# Patient Record
Sex: MALE | Race: ASIAN | NOT HISPANIC OR LATINO | Employment: UNEMPLOYED
[De-identification: names, ages, dates, MRNs, and addresses within clinical notes are randomized per-mention and may not be internally consistent; named-entity substitution may affect disease eponyms.]

---

## 2017-04-06 ENCOUNTER — RECORDS - HEALTHEAST (OUTPATIENT)
Dept: ADMINISTRATIVE | Facility: OTHER | Age: 72
End: 2017-04-06

## 2021-05-29 ENCOUNTER — RECORDS - HEALTHEAST (OUTPATIENT)
Dept: ADMINISTRATIVE | Facility: CLINIC | Age: 76
End: 2021-05-29

## 2023-10-06 ENCOUNTER — APPOINTMENT (OUTPATIENT)
Dept: CT IMAGING | Facility: HOSPITAL | Age: 78
DRG: 378 | End: 2023-10-06
Attending: EMERGENCY MEDICINE
Payer: COMMERCIAL

## 2023-10-06 ENCOUNTER — APPOINTMENT (OUTPATIENT)
Dept: INTERPRETER SERVICES | Facility: CLINIC | Age: 78
End: 2023-10-06
Payer: COMMERCIAL

## 2023-10-06 ENCOUNTER — ANESTHESIA EVENT (OUTPATIENT)
Dept: SURGERY | Facility: HOSPITAL | Age: 78
DRG: 378 | End: 2023-10-06
Payer: COMMERCIAL

## 2023-10-06 ENCOUNTER — HOSPITAL ENCOUNTER (INPATIENT)
Facility: HOSPITAL | Age: 78
LOS: 2 days | Discharge: HOME | DRG: 378 | End: 2023-10-08
Attending: EMERGENCY MEDICINE | Admitting: HOSPITALIST
Payer: COMMERCIAL

## 2023-10-06 ENCOUNTER — ANESTHESIA (OUTPATIENT)
Dept: SURGERY | Facility: HOSPITAL | Age: 78
DRG: 378 | End: 2023-10-06
Payer: COMMERCIAL

## 2023-10-06 DIAGNOSIS — K92.2 GASTROINTESTINAL HEMORRHAGE, UNSPECIFIED GASTROINTESTINAL HEMORRHAGE TYPE: ICD-10-CM

## 2023-10-06 DIAGNOSIS — K92.1 MELENA: Primary | ICD-10-CM

## 2023-10-06 DIAGNOSIS — E03.9 HYPOTHYROIDISM, UNSPECIFIED TYPE: ICD-10-CM

## 2023-10-06 DIAGNOSIS — I10 ESSENTIAL HYPERTENSION, BENIGN: ICD-10-CM

## 2023-10-06 PROBLEM — D62 ANEMIA DUE TO BLOOD LOSS, ACUTE: Status: ACTIVE | Noted: 2023-10-06

## 2023-10-06 LAB
ABO/RH(D): NORMAL
ALBUMIN UR-MCNC: NEGATIVE MG/DL
ANION GAP SERPL CALCULATED.3IONS-SCNC: 8 MMOL/L (ref 7–15)
ANION GAP SERPL CALCULATED.3IONS-SCNC: 8 MMOL/L (ref 7–15)
ANTIBODY SCREEN: NEGATIVE
APPEARANCE UR: CLEAR
BASO+EOS+MONOS # BLD AUTO: ABNORMAL 10*3/UL
BASO+EOS+MONOS NFR BLD AUTO: ABNORMAL %
BASOPHILS # BLD AUTO: 0 10E3/UL (ref 0–0.2)
BASOPHILS NFR BLD AUTO: 0 %
BILIRUB UR QL STRIP: NEGATIVE
BLD PROD TYP BPU: NORMAL
BLOOD COMPONENT TYPE: NORMAL
BUN SERPL-MCNC: 38.3 MG/DL (ref 8–23)
BUN SERPL-MCNC: 48.2 MG/DL (ref 8–23)
CALCIUM SERPL-MCNC: 8 MG/DL (ref 8.8–10.2)
CALCIUM SERPL-MCNC: 8.4 MG/DL (ref 8.8–10.2)
CHLORIDE SERPL-SCNC: 108 MMOL/L (ref 98–107)
CHLORIDE SERPL-SCNC: 112 MMOL/L (ref 98–107)
CODING SYSTEM: NORMAL
COLOR UR AUTO: COLORLESS
CREAT SERPL-MCNC: 1.49 MG/DL (ref 0.67–1.17)
CREAT SERPL-MCNC: 1.55 MG/DL (ref 0.67–1.17)
CROSSMATCH: NORMAL
DEPRECATED HCO3 PLAS-SCNC: 20 MMOL/L (ref 22–29)
DEPRECATED HCO3 PLAS-SCNC: 21 MMOL/L (ref 22–29)
EGFRCR SERPLBLD CKD-EPI 2021: 46 ML/MIN/1.73M2
EGFRCR SERPLBLD CKD-EPI 2021: 48 ML/MIN/1.73M2
EOSINOPHIL # BLD AUTO: 0.9 10E3/UL (ref 0–0.7)
EOSINOPHIL NFR BLD AUTO: 8 %
ERYTHROCYTE [DISTWIDTH] IN BLOOD BY AUTOMATED COUNT: 14.6 % (ref 10–15)
FERRITIN SERPL-MCNC: 158 NG/ML (ref 31–409)
FOLATE SERPL-MCNC: 12.6 NG/ML (ref 4.6–34.8)
GLUCOSE BLDC GLUCOMTR-MCNC: 87 MG/DL (ref 70–99)
GLUCOSE SERPL-MCNC: 101 MG/DL (ref 70–99)
GLUCOSE SERPL-MCNC: 85 MG/DL (ref 70–99)
GLUCOSE UR STRIP-MCNC: NEGATIVE MG/DL
HBA1C MFR BLD: 5.7 %
HCT VFR BLD AUTO: 22 % (ref 40–53)
HGB BLD-MCNC: 6.3 G/DL (ref 13.3–17.7)
HGB BLD-MCNC: 7.2 G/DL (ref 13.3–17.7)
HGB BLD-MCNC: 7.4 G/DL (ref 13.3–17.7)
HGB BLD-MCNC: 7.7 G/DL (ref 13.3–17.7)
HGB UR QL STRIP: NEGATIVE
IMM GRANULOCYTES # BLD: 0.1 10E3/UL
IMM GRANULOCYTES NFR BLD: 1 %
IRON BINDING CAPACITY (ROCHE): 241 UG/DL (ref 240–430)
IRON SATN MFR SERPL: 8 % (ref 15–46)
IRON SERPL-MCNC: 20 UG/DL (ref 61–157)
ISSUE DATE AND TIME: NORMAL
KETONES UR STRIP-MCNC: NEGATIVE MG/DL
LACTATE SERPL-SCNC: 0.8 MMOL/L (ref 0.7–2)
LEUKOCYTE ESTERASE UR QL STRIP: NEGATIVE
LIPASE SERPL-CCNC: 22 U/L (ref 13–60)
LYMPHOCYTES # BLD AUTO: 1.1 10E3/UL (ref 0.8–5.3)
LYMPHOCYTES NFR BLD AUTO: 10 %
MAGNESIUM SERPL-MCNC: 2.1 MG/DL (ref 1.7–2.3)
MCH RBC QN AUTO: 31 PG (ref 26.5–33)
MCHC RBC AUTO-ENTMCNC: 32.7 G/DL (ref 31.5–36.5)
MCV RBC AUTO: 95 FL (ref 78–100)
MONOCYTES # BLD AUTO: 1.2 10E3/UL (ref 0–1.3)
MONOCYTES NFR BLD AUTO: 11 %
NEUTROPHILS # BLD AUTO: 7.6 10E3/UL (ref 1.6–8.3)
NEUTROPHILS NFR BLD AUTO: 70 %
NITRATE UR QL: NEGATIVE
NRBC # BLD AUTO: 0 10E3/UL
NRBC BLD AUTO-RTO: 0 /100
PH UR STRIP: 5 [PH] (ref 5–7)
PLATELET # BLD AUTO: 191 10E3/UL (ref 150–450)
POTASSIUM SERPL-SCNC: 5.1 MMOL/L (ref 3.4–5.3)
POTASSIUM SERPL-SCNC: 5.3 MMOL/L (ref 3.4–5.3)
POTASSIUM SERPL-SCNC: 5.4 MMOL/L (ref 3.4–5.3)
RBC # BLD AUTO: 2.32 10E6/UL (ref 4.4–5.9)
RBC URINE: 0 /HPF
SODIUM SERPL-SCNC: 137 MMOL/L (ref 135–145)
SODIUM SERPL-SCNC: 140 MMOL/L (ref 135–145)
SP GR UR STRIP: 1.01 (ref 1–1.03)
SPECIMEN EXPIRATION DATE: NORMAL
SPECIMEN EXPIRATION DATE: NORMAL
T4 FREE SERPL-MCNC: 0.74 NG/DL (ref 0.9–1.7)
TSH SERPL DL<=0.005 MIU/L-ACNC: 17.51 UIU/ML (ref 0.3–4.2)
UNIT ABO/RH: NORMAL
UNIT NUMBER: NORMAL
UNIT STATUS: NORMAL
UNIT TYPE ISBT: 5100
UROBILINOGEN UR STRIP-MCNC: <2 MG/DL
VIT B12 SERPL-MCNC: 393 PG/ML (ref 232–1245)
WBC # BLD AUTO: 11 10E3/UL (ref 4–11)
WBC URINE: <1 /HPF

## 2023-10-06 PROCEDURE — 86923 COMPATIBILITY TEST ELECTRIC: CPT | Performed by: INTERNAL MEDICINE

## 2023-10-06 PROCEDURE — 85018 HEMOGLOBIN: CPT | Performed by: INTERNAL MEDICINE

## 2023-10-06 PROCEDURE — 120N000001 HC R&B MED SURG/OB

## 2023-10-06 PROCEDURE — 83735 ASSAY OF MAGNESIUM: CPT | Performed by: EMERGENCY MEDICINE

## 2023-10-06 PROCEDURE — C9113 INJ PANTOPRAZOLE SODIUM, VIA: HCPCS | Mod: JZ | Performed by: EMERGENCY MEDICINE

## 2023-10-06 PROCEDURE — 36415 COLL VENOUS BLD VENIPUNCTURE: CPT | Performed by: EMERGENCY MEDICINE

## 2023-10-06 PROCEDURE — 84439 ASSAY OF FREE THYROXINE: CPT | Performed by: HOSPITALIST

## 2023-10-06 PROCEDURE — 250N000011 HC RX IP 250 OP 636: Mod: JZ | Performed by: HOSPITALIST

## 2023-10-06 PROCEDURE — 80048 BASIC METABOLIC PNL TOTAL CA: CPT | Performed by: INTERNAL MEDICINE

## 2023-10-06 PROCEDURE — P9016 RBC LEUKOCYTES REDUCED: HCPCS | Performed by: INTERNAL MEDICINE

## 2023-10-06 PROCEDURE — 99222 1ST HOSP IP/OBS MODERATE 55: CPT | Performed by: HOSPITALIST

## 2023-10-06 PROCEDURE — 83605 ASSAY OF LACTIC ACID: CPT | Performed by: EMERGENCY MEDICINE

## 2023-10-06 PROCEDURE — 84132 ASSAY OF SERUM POTASSIUM: CPT | Performed by: INTERNAL MEDICINE

## 2023-10-06 PROCEDURE — 96375 TX/PRO/DX INJ NEW DRUG ADDON: CPT

## 2023-10-06 PROCEDURE — 250N000011 HC RX IP 250 OP 636: Mod: JZ | Performed by: EMERGENCY MEDICINE

## 2023-10-06 PROCEDURE — 84443 ASSAY THYROID STIM HORMONE: CPT | Performed by: HOSPITALIST

## 2023-10-06 PROCEDURE — 82607 VITAMIN B-12: CPT | Performed by: INTERNAL MEDICINE

## 2023-10-06 PROCEDURE — 96374 THER/PROPH/DIAG INJ IV PUSH: CPT | Mod: 59

## 2023-10-06 PROCEDURE — 250N000009 HC RX 250: Performed by: INTERNAL MEDICINE

## 2023-10-06 PROCEDURE — 99285 EMERGENCY DEPT VISIT HI MDM: CPT | Mod: 25

## 2023-10-06 PROCEDURE — C9113 INJ PANTOPRAZOLE SODIUM, VIA: HCPCS | Mod: JZ | Performed by: HOSPITALIST

## 2023-10-06 PROCEDURE — 81001 URINALYSIS AUTO W/SCOPE: CPT | Performed by: EMERGENCY MEDICINE

## 2023-10-06 PROCEDURE — 80048 BASIC METABOLIC PNL TOTAL CA: CPT | Performed by: EMERGENCY MEDICINE

## 2023-10-06 PROCEDURE — 36415 COLL VENOUS BLD VENIPUNCTURE: CPT | Performed by: HOSPITALIST

## 2023-10-06 PROCEDURE — 82728 ASSAY OF FERRITIN: CPT | Performed by: INTERNAL MEDICINE

## 2023-10-06 PROCEDURE — 36415 COLL VENOUS BLD VENIPUNCTURE: CPT | Performed by: INTERNAL MEDICINE

## 2023-10-06 PROCEDURE — 86850 RBC ANTIBODY SCREEN: CPT | Performed by: INTERNAL MEDICINE

## 2023-10-06 PROCEDURE — 85025 COMPLETE CBC W/AUTO DIFF WBC: CPT | Performed by: EMERGENCY MEDICINE

## 2023-10-06 PROCEDURE — 258N000003 HC RX IP 258 OP 636: Performed by: EMERGENCY MEDICINE

## 2023-10-06 PROCEDURE — 83690 ASSAY OF LIPASE: CPT | Performed by: EMERGENCY MEDICINE

## 2023-10-06 PROCEDURE — 999N000141 HC STATISTIC PRE-PROCEDURE NURSING ASSESSMENT: Performed by: INTERNAL MEDICINE

## 2023-10-06 PROCEDURE — 82746 ASSAY OF FOLIC ACID SERUM: CPT | Performed by: INTERNAL MEDICINE

## 2023-10-06 PROCEDURE — 96361 HYDRATE IV INFUSION ADD-ON: CPT

## 2023-10-06 PROCEDURE — 74174 CTA ABD&PLVS W/CONTRAST: CPT

## 2023-10-06 PROCEDURE — 86901 BLOOD TYPING SEROLOGIC RH(D): CPT | Performed by: EMERGENCY MEDICINE

## 2023-10-06 PROCEDURE — 258N000003 HC RX IP 258 OP 636: Performed by: HOSPITALIST

## 2023-10-06 PROCEDURE — 83550 IRON BINDING TEST: CPT | Performed by: INTERNAL MEDICINE

## 2023-10-06 PROCEDURE — 83036 HEMOGLOBIN GLYCOSYLATED A1C: CPT | Performed by: HOSPITALIST

## 2023-10-06 PROCEDURE — 85018 HEMOGLOBIN: CPT | Performed by: HOSPITALIST

## 2023-10-06 RX ORDER — ONDANSETRON 4 MG/1
4 TABLET, ORALLY DISINTEGRATING ORAL EVERY 6 HOURS PRN
Status: DISCONTINUED | OUTPATIENT
Start: 2023-10-06 | End: 2023-10-08 | Stop reason: HOSPADM

## 2023-10-06 RX ORDER — ACETAMINOPHEN 325 MG/1
650 TABLET ORAL EVERY 6 HOURS PRN
Status: DISCONTINUED | OUTPATIENT
Start: 2023-10-06 | End: 2023-10-08 | Stop reason: HOSPADM

## 2023-10-06 RX ORDER — SODIUM CHLORIDE 9 MG/ML
INJECTION, SOLUTION INTRAVENOUS CONTINUOUS
Status: ACTIVE | OUTPATIENT
Start: 2023-10-06 | End: 2023-10-06

## 2023-10-06 RX ORDER — HYDROMORPHONE HCL IN WATER/PF 6 MG/30 ML
0.2 PATIENT CONTROLLED ANALGESIA SYRINGE INTRAVENOUS
Status: DISCONTINUED | OUTPATIENT
Start: 2023-10-06 | End: 2023-10-08 | Stop reason: HOSPADM

## 2023-10-06 RX ORDER — FERROUS SULFATE 325(65) MG
325 TABLET, DELAYED RELEASE (ENTERIC COATED) ORAL DAILY
Status: ON HOLD | COMMUNITY
End: 2024-03-23

## 2023-10-06 RX ORDER — ACETAMINOPHEN 650 MG/1
650 SUPPOSITORY RECTAL EVERY 6 HOURS PRN
Status: DISCONTINUED | OUTPATIENT
Start: 2023-10-06 | End: 2023-10-08 | Stop reason: HOSPADM

## 2023-10-06 RX ORDER — LEVOTHYROXINE SODIUM ANHYDROUS 100 UG/5ML
25 INJECTION, POWDER, LYOPHILIZED, FOR SOLUTION INTRAVENOUS ONCE
Status: COMPLETED | OUTPATIENT
Start: 2023-10-06 | End: 2023-10-06

## 2023-10-06 RX ORDER — NALOXONE HYDROCHLORIDE 0.4 MG/ML
0.4 INJECTION, SOLUTION INTRAMUSCULAR; INTRAVENOUS; SUBCUTANEOUS
Status: DISCONTINUED | OUTPATIENT
Start: 2023-10-06 | End: 2023-10-08 | Stop reason: HOSPADM

## 2023-10-06 RX ORDER — ROSUVASTATIN CALCIUM 10 MG/1
10 TABLET, COATED ORAL DAILY
COMMUNITY
End: 2024-03-17

## 2023-10-06 RX ORDER — ACETAMINOPHEN 500 MG
325-650 TABLET ORAL EVERY 6 HOURS PRN
COMMUNITY

## 2023-10-06 RX ORDER — LEVOTHYROXINE SODIUM 25 UG/1
25 TABLET ORAL
Status: DISCONTINUED | OUTPATIENT
Start: 2023-10-07 | End: 2023-10-08 | Stop reason: HOSPADM

## 2023-10-06 RX ORDER — HYDRALAZINE HYDROCHLORIDE 20 MG/ML
10 INJECTION INTRAMUSCULAR; INTRAVENOUS EVERY 6 HOURS PRN
Status: DISCONTINUED | OUTPATIENT
Start: 2023-10-06 | End: 2023-10-08 | Stop reason: HOSPADM

## 2023-10-06 RX ORDER — AMOXICILLIN 250 MG
1 CAPSULE ORAL 2 TIMES DAILY PRN
Status: DISCONTINUED | OUTPATIENT
Start: 2023-10-06 | End: 2023-10-08 | Stop reason: HOSPADM

## 2023-10-06 RX ORDER — MORPHINE SULFATE 4 MG/ML
4 INJECTION, SOLUTION INTRAMUSCULAR; INTRAVENOUS ONCE
Status: COMPLETED | OUTPATIENT
Start: 2023-10-06 | End: 2023-10-06

## 2023-10-06 RX ORDER — ONDANSETRON 2 MG/ML
4 INJECTION INTRAMUSCULAR; INTRAVENOUS ONCE
Status: COMPLETED | OUTPATIENT
Start: 2023-10-06 | End: 2023-10-06

## 2023-10-06 RX ORDER — NALOXONE HYDROCHLORIDE 0.4 MG/ML
0.2 INJECTION, SOLUTION INTRAMUSCULAR; INTRAVENOUS; SUBCUTANEOUS
Status: DISCONTINUED | OUTPATIENT
Start: 2023-10-06 | End: 2023-10-08 | Stop reason: HOSPADM

## 2023-10-06 RX ORDER — LIDOCAINE 40 MG/G
CREAM TOPICAL
Status: DISCONTINUED | OUTPATIENT
Start: 2023-10-06 | End: 2023-10-06 | Stop reason: HOSPADM

## 2023-10-06 RX ORDER — IOPAMIDOL 755 MG/ML
75 INJECTION, SOLUTION INTRAVASCULAR ONCE
Status: COMPLETED | OUTPATIENT
Start: 2023-10-06 | End: 2023-10-06

## 2023-10-06 RX ORDER — HYDROMORPHONE HCL IN WATER/PF 6 MG/30 ML
0.4 PATIENT CONTROLLED ANALGESIA SYRINGE INTRAVENOUS
Status: DISCONTINUED | OUTPATIENT
Start: 2023-10-06 | End: 2023-10-08 | Stop reason: HOSPADM

## 2023-10-06 RX ORDER — AMOXICILLIN 250 MG
2 CAPSULE ORAL 2 TIMES DAILY PRN
Status: DISCONTINUED | OUTPATIENT
Start: 2023-10-06 | End: 2023-10-08 | Stop reason: HOSPADM

## 2023-10-06 RX ORDER — ONDANSETRON 2 MG/ML
4 INJECTION INTRAMUSCULAR; INTRAVENOUS EVERY 6 HOURS PRN
Status: DISCONTINUED | OUTPATIENT
Start: 2023-10-06 | End: 2023-10-08 | Stop reason: HOSPADM

## 2023-10-06 RX ORDER — LOSARTAN POTASSIUM 50 MG/1
50 TABLET ORAL DAILY
Status: ON HOLD | COMMUNITY
End: 2023-10-08

## 2023-10-06 RX ADMIN — ONDANSETRON 4 MG: 2 INJECTION INTRAMUSCULAR; INTRAVENOUS at 01:22

## 2023-10-06 RX ADMIN — PANTOPRAZOLE SODIUM 40 MG: 40 INJECTION, POWDER, FOR SOLUTION INTRAVENOUS at 10:58

## 2023-10-06 RX ADMIN — LEVOTHYROXINE SODIUM ANHYDROUS 25 MCG: 100 INJECTION, POWDER, LYOPHILIZED, FOR SOLUTION INTRAVENOUS at 11:14

## 2023-10-06 RX ADMIN — PANTOPRAZOLE SODIUM 40 MG: 40 INJECTION, POWDER, FOR SOLUTION INTRAVENOUS at 03:01

## 2023-10-06 RX ADMIN — MORPHINE SULFATE 4 MG: 4 INJECTION, SOLUTION INTRAMUSCULAR; INTRAVENOUS at 01:22

## 2023-10-06 RX ADMIN — IOPAMIDOL 75 ML: 755 INJECTION, SOLUTION INTRAVENOUS at 02:20

## 2023-10-06 RX ADMIN — PANTOPRAZOLE SODIUM 40 MG: 40 INJECTION, POWDER, FOR SOLUTION INTRAVENOUS at 21:06

## 2023-10-06 RX ADMIN — SODIUM CHLORIDE: 9 INJECTION, SOLUTION INTRAVENOUS at 05:39

## 2023-10-06 RX ADMIN — SODIUM CHLORIDE 1000 ML: 9 INJECTION, SOLUTION INTRAVENOUS at 01:21

## 2023-10-06 ASSESSMENT — ACTIVITIES OF DAILY LIVING (ADL)
ADLS_ACUITY_SCORE: 33
ADLS_ACUITY_SCORE: 35
ADLS_ACUITY_SCORE: 35
ADLS_ACUITY_SCORE: 21
ADLS_ACUITY_SCORE: 21
ADLS_ACUITY_SCORE: 35
ADLS_ACUITY_SCORE: 37
DEPENDENT_IADLS:: INDEPENDENT
ADLS_ACUITY_SCORE: 21
ADLS_ACUITY_SCORE: 35
ADLS_ACUITY_SCORE: 35

## 2023-10-06 ASSESSMENT — ENCOUNTER SYMPTOMS
BLOOD IN STOOL: 1
CONSTIPATION: 1
LIGHT-HEADEDNESS: 0
DIZZINESS: 0
ABDOMINAL PAIN: 1

## 2023-10-06 NOTE — PHARMACY-ADMISSION MEDICATION HISTORY
Pharmacist Admission Medication History    Admission medication history is complete. The information provided in this note is only as accurate as the sources available at the time of the update.    Information Source(s): Family member via in-person, Surescripts, preferred pharmacy (via telephone)    Pertinent Information: family member reported giving patient his meds and reported picking them all up from [facility]. Last took meds ~2 days ago.  Adherence - family member was able to list current meds he takes by indications (such as high blood pressure, cholesterol, etc). Reports only filling at preferred pharmacy. Writer called pharmacy to inquire about any new rxs (last fills of losartan and rosuvastatin were in November 2022 for 90 day supply), preferred pharmacy reported no new rxs filled since 1/9/23. Suspect some degree of non-adherence due to length of time between anticipated refills.    Changes made to PTA medication list:  Added: all below meds (med list previously blank)  Deleted: None  Changed: None    Medication Affordability:  Not including over the counter (OTC) medications, was there a time in the past 3 months when you did not take your medications as prescribed because of cost?: No    Allergies reviewed with patient and updates made in EHR: no    Medication History Completed By: Nicollette McMann, [facility] 10/6/2023 10:25 AM    PTA Med List   Medication Sig Last Dose    acetaminophen (TYLENOL) 325 MG tablet Take 325-650 mg by mouth every 6 hours as needed for mild pain  at PRN    ferrous sulfate (FE TABS) 325 (65 Fe) MG EC tablet Take 325 mg by mouth daily Past Week    losartan (COZAAR) 50 MG tablet Take 50 mg by mouth daily Past Week    omeprazole (PRILOSEC) 20 MG DR capsule Take 20 mg by mouth daily Past Week    rosuvastatin (CRESTOR) 10 MG tablet Take 10 mg by mouth daily Past Week

## 2023-10-06 NOTE — ED NOTES
Pt resting.  Family at bs.  Nad.  Vss. No c/o at this time.  Ns infusing 75cc/hr on pump.  Reports has been to restroom over night but only urinated.  No bloody stool or emesis.  Pt hemoglobin dropped overnight so waiting for blood to be ready for transfusion.  Provider notified.

## 2023-10-06 NOTE — ANESTHESIA PREPROCEDURE EVALUATION
Anesthesia Pre-Procedure Evaluation    Patient: Allen Julian   MRN: [id number] : 1945        Procedure : Procedure(s):  ESOPHAGOGASTRODUODENOSCOPY          History reviewed. No pertinent past medical history.   History reviewed. No pertinent surgical history.   No Known Allergies   Social History     Tobacco Use    Smoking status: Not on file    Smokeless tobacco: Not on file   Substance Use Topics    Alcohol use: Not on file      Wt Readings from Last 1 Encounters:   10/06/23 65.9 kg (145 lb 4.5 oz)        Anesthesia Evaluation   Pt has had prior anesthetic.         ROS/MED HX  ENT/Pulmonary:  - neg pulmonary ROS     Neurologic:  - neg neurologic ROS     Cardiovascular:     (+)  hypertension- -   -  - -                                   (-) wheezes   METS/Exercise Tolerance: >4 METS    Hematologic:     (+)      anemia, history of blood transfusion (Transfusion just prior to procedure),         Musculoskeletal:  - neg musculoskeletal ROS     GI/Hepatic: Comment: GI bleed      Renal/Genitourinary:     (+) renal disease, type: CRI,            Endo:  - neg endo ROS     Psychiatric/Substance Use:  - neg psychiatric ROS     Infectious Disease:  - neg infectious disease ROS     Malignancy:  - neg malignancy ROS     Other:  - neg other ROS          Physical Exam    Airway        Mallampati: III   TM distance: > 3 FB   Neck ROM: full   Mouth opening: > 3 cm    Respiratory Devices and Support         Dental  no notable dental history   Comment: Upper dentures. Missing teeth on lower.     (+) Multiple visibly decayed, broken teeth and Removable bridges or other hardware      Cardiovascular   cardiovascular exam normal          Pulmonary   pulmonary exam normal        (-) no wheezes    Other findings: Hb 6.3    OUTSIDE LABS:  CBC:   Lab Results   Component Value Date    WBC 11.0 10/06/2023    HGB 6.3 (LL) 10/06/2023    HGB 7.2 (L) 10/06/2023    HCT 22.0 (L) 10/06/2023     10/06/2023     BMP:   Lab Results    Component Value Date     10/06/2023    POTASSIUM 5.1 10/06/2023    CHLORIDE 108 (H) 10/06/2023    CO2 21 (L) 10/06/2023    BUN 48.2 (H) 10/06/2023    CR 1.55 (H) 10/06/2023     (H) 10/06/2023     COAGS: No results found for: PTT, INR, FIBR  POC: No results found for: BGM, HCG, HCGS  HEPATIC: No results found for: ALBUMIN, PROTTOTAL, ALT, AST, GGT, ALKPHOS, BILITOTAL, BILIDIRECT, LAVON  OTHER:   Lab Results   Component Value Date    LACT 0.8 10/06/2023    A1C 5.7 (H) 10/06/2023    SAVI 8.4 (L) 10/06/2023    MAG 2.1 10/06/2023    LIPASE 22 10/06/2023    TSH 17.51 (H) 10/06/2023    T4 0.74 (L) 10/06/2023       Anesthesia Plan    ASA Status:  3, emergent    NPO Status:  NPO Appropriate    Anesthesia Type: MAC.   Induction: Intravenous, Propofol.   Maintenance: TIVA.        Consents    Anesthesia Plan(s) and associated risks, benefits, and realistic alternatives discussed. Questions answered and patient/representative(s) expressed understanding.     - Discussed: Risks, Benefits and Alternatives for BOTH SEDATION and the PROCEDURE were discussed     - Discussed with:  Patient,        - Patient is DNR/DNI Status: No          Postoperative Care    Pain management: IV analgesics, Oral pain medications, Multi-modal analgesia.   PONV prophylaxis: Ondansetron (or other 5HT-3), Dexamethasone or Solumedrol     Comments:    Other Comments: TIVA with Propofol  Zofran for PONV     used for discussion of risks and benefits            Curtis Herndon MD

## 2023-10-06 NOTE — OR NURSING
Dr. Stinson informed pt that the procedure is delayed and will be done tomorrow.  Per Dr. Stinson pt can be on a clear liquid diet. ED will receive pt back when a room is available in about minutes

## 2023-10-06 NOTE — PLAN OF CARE
Problem: Fluid Volume Deficit  Goal: Fluid Balance  Outcome: Progressing   Goal Outcome Evaluation:         Patient returned from [facility] at 14:50. Patient was not able to have EGD today, rescheduled for tomorrow. Clear liquid diet started. Patient to be NPO tonight after midnight. Denied having pain. Pt is Hmong speaking, family at bedside. A&Ox4. SBA.   Patient had 1 unit of blood prior to going to [facility] today for Hgb 6.3. Last Hgb at 14:00 was 7.7. No bleeding noted. Start blood glucose checks today. Hospitalist text paged regarding of Hbg and K 5.4.

## 2023-10-06 NOTE — H&P
Preop H&P:    Please see consult note of today.  There have been no changes.    Airway is intact    Chest is clear to auscultation    Cardiovascular exam shows a regular rate and rhythm.    ASA 3    There is nothing on history or physical to preclude this exam    EGD for GI bleed is planned.    Osiel Stinson MD  [facility]

## 2023-10-06 NOTE — CONSULTS
Care Management Initial Consult    General Information  Assessment completed with: Children, marnie Gautam  Type of CM/SW Visit: Initial Assessment    Primary Care Provider verified and updated as needed: Yes   Readmission within the last 30 days: no previous admission in last 30 days      Reason for Consult: discharge planning  Advance Care Planning: Advance Care Planning Reviewed: no concerns identified          Communication Assessment  Patient's communication style: spoken language (English or Bilingual)             Cognitive  Cognitive/Neuro/Behavioral: WDL                      Living Environment:   People in home: child(uvaldo), adult, grandchild(uvaldo)  marnie Gautam and his wife and children  Current living Arrangements: house      Able to return to prior arrangements: yes       Family/Social Support:  Care provided by: self  Provides care for: no one  Marital Status:   Children          Description of Support System: Supportive, Involved    Support Assessment: Adequate family and caregiver support, Adequate social supports    Current Resources:   Patient receiving home care services: No     Community Resources: None  Equipment currently used at home:    Supplies currently used at home: None    Employment/Financial:  Employment Status:          Financial Concerns:             Does the patient's insurance plan have a 3 day qualifying hospital stay waiver?  No    Lifestyle & Psychosocial Needs:  Social Determinants of Health     Food Insecurity: Not on file   Depression: Not on file   Housing Stability: Not on file   Tobacco Use: Not on file   Financial Resource Strain: Not on file   Alcohol Use: Not on file   Transportation Needs: Not on file   Physical Activity: Not on file   Interpersonal Safety: Not on file   Stress: Not on file   Social Connections: Not on file       Functional Status:  Prior to admission patient needed assistance:   Dependent ADLs:: Independent, Ambulation-no assistive device  Dependent  IADLs:: Independent  Assesssment of Functional Status: Not at  functional baseline    Mental Health Status:          Chemical Dependency Status:                Values/Beliefs:  Spiritual, Cultural Beliefs, Nondenominational Practices, Values that affect care:                 Additional Information:  Met with patient, son Dain at bedside, introduced self and care management role. Dain reports that patient lives with him and his family in his home. Allen is independent with all his I/ADLs. Drives. Unknown discharge needs at this time, CM to follow. Family to transport.     Cyndy Marin RN

## 2023-10-06 NOTE — H&P
[facility]    History and Physical - Hospitalist Service       Date of Admission:  10/6/2023    Assessment & Plan      Allen Julian is a 78 year old male admitted on 10/6/2023. He came to the emergency department for evaluation of abdominal pain, bloating and bleeding    #Upper GI bleed, likely  -Melanotic stools onset 10/2/2023, no bowel movement in the last 2 days  -BUN 48.2 concerning for upper GI bleed  -CTA abdomen pelvis showed under distended stomach, site of GI bleed not identified  -IV Protonix 40 mg twice a day  -GI consult    #Acute blood loss anemia  -Hemoglobin 7.2  -Serial hemoglobin, transfuse if < 7    #Acute kidney injury  -IV hydration  -Avoid nephrotoxins    #Essential hypertension  -Currently not taking any medications  -IV hydralazine as needed    #Type 2 diabetes mellitus  #Hypothyroidism  -Not taking medications  -Check TSH, reflex Free T4  -Check hemoglobin A1c     Diet: NPO for Medical/Clinical Reasons Except for: No Exceptions    DVT Prophylaxis: Pneumatic Compression Devices  Jimenez Catheter: Not present  Lines: None     Cardiac Monitoring: None  Code Status: Full Code          Disposition Plan      Expected Discharge Date: 10/08/2023                  Ervin Golden MD  Hospitalist Service  [facility]  Securely message with [employer] (more info)  Text page via [employer] Paging/Directory     ______________________________________________________________________    Chief Complaint   Abdominal pain, melena    History is obtained from the patient, electronic health record, emergency department physician, and patient's son    History of Present Illness   Allen Julian is a 78 year old male who came to the emergency department with family for evaluation of above chief complaint.  Past medical history of essential hypertension, type 2 diabetes, hypothyroidism, angioedema.  Patient is currently not taking any medications on a regular basis.  Denies tobacco,  alcohol, NSAIDs or aspirin.  On 10/2/2023 he noted black stools followed by maroon-colored stools.  He had some abdominal bloating and gas with associated discomfort prior to ED arrival.  Denies similar symptoms in the past, nausea or vomiting.  His son reports that he eats very little and may have lost some weight in the last year.      Past Medical History    History reviewed. No pertinent past medical history.    Past Surgical History   History reviewed. No pertinent surgical history.    Prior to Admission Medications   None           Physical Exam   Vital Signs: Temp: 98.6  F (37  C) Temp src: Oral BP: (!) 151/66 Pulse: 52   Resp: 15 SpO2: 96 % O2 Device: None (Room air)    Weight: 145 lbs 4.53 oz    Constitutional: awake and alert  Respiratory: no increased work of breathing and good air exchange  Cardiovascular: regular rate and rhythm and normal S1 and S2  GI: normal bowel sounds, soft, and non-tender  Skin: no bruising or bleeding  Musculoskeletal: no lower extremity pitting edema present  Neurologic: Mental Status Exam:  Level of Alertness:   awake    Medical Decision Making       45 MINUTES SPENT BY ME on the date of service doing chart review, history, exam, documentation & further activities per the note.  MANAGEMENT DISCUSSED with the following over the past 24 hours: Patient's son       Data     I have personally reviewed the following data over the past 24 hrs:    11.0  \   7.2 (L)   / 191     137 108 (H) 48.2 (H) /  101 (H)   5.1 21 (L) 1.55 (H) \     ALT: N/A AST: N/A AP: N/A TBILI: N/A   ALB: N/A TOT PROTEIN: N/A LIPASE: 22     Procal: N/A CRP: N/A Lactic Acid: 0.8         Imaging results reviewed over the past 24 hrs:   Recent Results (from the past 24 hour(s))   CTA Abdomen Pelvis with Contrast    Narrative    EXAM: CTA ABDOMEN PELVIS WITH CONTRAST  LOCATION: [facility]  DATE: 10/6/2023    INDICATION: abdominal pain GI bleeding  COMPARISON: None.  TECHNIQUE: CT angiogram  abdomen pelvis during arterial phase of injection of IV contrast. 2D and 3D MIP reconstructions were performed by the CT technologist. Dose reduction techniques were used.  CONTRAST: ISOVUE 370 75ML    FINDINGS:  ANGIOGRAM ABDOMEN/PELVIS: Atherosclerotic aorta. Ectatic iliac arteries. 2.4 cm left common iliac artery aneurysm.    LOWER CHEST: Normal.    HEPATOBILIARY: Normal.    PANCREAS: Normal.    SPLEEN: Normal.    ADRENAL GLANDS: Normal.    KIDNEYS/BLADDER: Renal cysts. Subcentimeter renal hypodensities small for characterization.    BOWEL: Underdistention of the stomach and sigmoid colon. Wall thickening not excluded. No bowel dilatation. Diverticulosis.    LYMPH NODES: Normal.    PELVIC ORGANS: Normal.    MUSCULOSKELETAL: Fat-containing inguinal hernias. Degenerative change osseous structures      Impression    IMPRESSION:  1.  Site of GI bleed not identified.  2.  Stomach is under distended. Wall thickening not excluded.

## 2023-10-06 NOTE — OR NURSING
Patient transferred back to ED 31 via cart with 1 assist.  Spouse and son with patient.  Report given to ED RN.

## 2023-10-06 NOTE — ED TRIAGE NOTES
Pt had blood in stool 2 days ago with abdominal pain. Pt has not had a BM x 2 days. Pt denies nausea/vommitting or Chest pain.     Triage Assessment       Row Name 10/06/23 0036       Triage Assessment (Adult)    Airway WDL WDL       Respiratory WDL    Respiratory WDL WDL       Skin Circulation/Temperature WDL    Skin Circulation/Temperature WDL WDL       Cardiac WDL    Cardiac WDL WDL       Peripheral/Neurovascular WDL    Peripheral Neurovascular WDL WDL       Cognitive/Neuro/Behavioral WDL    Cognitive/Neuro/Behavioral WDL WDL

## 2023-10-06 NOTE — CONSULTS
GI CONSULT NOTE      Name: Allen Julian  Medical Record #: [id number]  YOB: 1945  Date of Admission: 10/6/2023  Date/Time: 10/6/2023/9:13 AM     CHIEF COMPLAINT: Dark Stools     HISTORY OF PRESENT ILLNESS: We were asked to see Allen Julian by Dr Bello for evaluation of possible gi bleeding. Allen Julian is a 78 year old year old male with history of hypertension, diabetes, hypothyroidism who presented with lack stools that began on October 2.  He also describes noting a scant amount of bright red blood in his stool earlier this week.  In the ER laboratory evaluation revealed a hemoglobin of 7.2 and down to 6.3 on repeat. No prior hgb for comparison. BUN was elevated but this occurred in the setting of acute kidney injury.  CTA abdomen did not reveal acute GI bleeding.  He is hemodynamically stable.  His last bowel movement was yesterday and described as small and very dark.    The patient was having upper abdominal pain earlier this week.  He describes his discomfort as being more in the right upper abdomen.  His appetite has been reduced over the past couple days but he has not had any nausea or vomiting.  He describes his stool as being very dark.  His norm is to pass 1-2 stools per day.  He has not noted a change in the frequency of his stools but does describe passing small volume of dark formed stool.  He has had associated bloating and symptoms more suggestive of constipation.    He is not on any blood thinners and denies using NSAIDs.  He has never had an EGD or colonoscopy.  He does not use tobacco or alcohol.    REVIEW OF SYSTEMS (ROS): Complete review of systems negative other than listed in HPI.    PAST MEDICAL HISTORY:  History reviewed. No pertinent past medical history.     FAMILY HISTORY:  History reviewed. No pertinent family history.    SOCIAL HISTORY:  Social History     Socioeconomic History    Marital status: Legally      Spouse name: Not on file    Number of children: Not on file     Years of education: Not on file    Highest education level: Not on file   Occupational History    Not on file   Tobacco Use    Smoking status: Not on file    Smokeless tobacco: Not on file   Substance and Sexual Activity    Alcohol use: Not on file    Drug use: Not on file    Sexual activity: Not on file   Other Topics Concern    Not on file   Social History Narrative    Not on file     Social Determinants of Health     Financial Resource Strain: Not on file   Food Insecurity: Not on file   Transportation Needs: Not on file   Physical Activity: Not on file   Stress: Not on file   Social Connections: Not on file   Interpersonal Safety: Not on file   Housing Stability: Not on file       MEDICATIONS PRIOR TO ADMISSION: (Not in a hospital admission)         ALLERGIES: Patient has no known allergies.    PHYSICAL EXAM:    /63   Pulse 53   Temp 98.6  F (37  C) (Oral)   Resp 27   Ht 1.524 m (5')   Wt 65.9 kg (145 lb 4.5 oz)   SpO2 95%   BMI 28.37 kg/m      GENERAL: Pleasant, no obvious distress, family at bedside.  was declined.   EYES: No scleral icterus  LUNGS: Clear to auscultation bilaterally  HEART: S1S2, no lower extremity edema  ABDOMEN: Non-distended. Positive bowel sounds. Soft, non-tender  MUSKULOSKELETAL:  Warm and well perfused, moves all extremities well  SKIN: No jaundice      LAB DATA:  CMP Results:   Recent Labs   Lab Test 10/06/23  0118      POTASSIUM 5.1   CHLORIDE 108*   CO2 21*   ANIONGAP 8   *   BUN 48.2*   CR 1.55*      CBC  Recent Labs   Lab 10/06/23  0737 10/06/23  0118   WBC  --  11.0   RBC  --  2.32*   HGB 6.3* 7.2*   HCT  --  22.0*   MCV  --  95   MCH  --  31.0   MCHC  --  32.7   RDW  --  14.6   PLT  --  191     INRNo lab results found in last 7 days.   Lipase   Date Value Ref Range Status   10/06/2023 22 13 - 60 U/L Final       IMAGING:  EXAM: CTA ABDOMEN PELVIS WITH CONTRAST  LOCATION: [facility]  DATE: 10/6/2023     INDICATION:  abdominal pain GI bleeding  COMPARISON: None.  TECHNIQUE: CT angiogram abdomen pelvis during arterial phase of injection of IV contrast. 2D and 3D MIP reconstructions were performed by the CT technologist. Dose reduction techniques were used.  CONTRAST: ISOVUE 370 75ML     FINDINGS:  ANGIOGRAM ABDOMEN/PELVIS: Atherosclerotic aorta. Ectatic iliac arteries. 2.4 cm left common iliac artery aneurysm.     LOWER CHEST: Normal.     HEPATOBILIARY: Normal.     PANCREAS: Normal.     SPLEEN: Normal.     ADRENAL GLANDS: Normal.     KIDNEYS/BLADDER: Renal cysts. Subcentimeter renal hypodensities small for characterization.     BOWEL: Underdistention of the stomach and sigmoid colon. Wall thickening not excluded. No bowel dilatation. Diverticulosis.     LYMPH NODES: Normal.     PELVIC ORGANS: Normal.     MUSCULOSKELETAL: Fat-containing inguinal hernias. Degenerative change osseous structures                                                                      IMPRESSION:  1.  Site of GI bleed not identified.  2.  Stomach is under distended. Wall thickening not excluded.    ASSESSMENT:  Melena, suspect acute blood loss anemia related to gi bleeding-- This is a 77 yo male with history of hypertension, diabetes, and hypothyroidism who presented with black stools.  He also describes passing a scant amount of bright red blood with his stool which I suspect might be outlet bleeding from hemorrhoids in the setting of constipation.  BUN is elevated but this occurred in the setting of acute kidney injury.  Based on his history and severe anemia, I do suspect upper GI bleed which could be related to peptic ulcer disease/gastritis/esophagitis/duodenitis.  Other possibilities include AVMs or even malignancy.  I have recommended proceeding with an upper endoscopy for further evaluation.  The patient would benefit from an eventual colonoscopy but timing to be determined based on findings on EGD.    For now, the patient should be n.p.o.   Recommend transfusion of RBCs.  Continuation of PPI.  Acute kidney injury- per primary team    PLAN:  NPO.  Transfuse RBCs and follow hemoglobin.  Continue IV PPI.  Proceed with upper endoscopy.  The patient will benefit from colonoscopy timing will be determined based on findings on EGD and clinical course.  It is possible that this can be done as an outpatient.    Total time spent on this encounter=45 minutes.   Discussed with Dr. Stinson who will also visit with the patient.                                                Geetha Kidd PA-C  Thank you for the opportunity to participate in the care of this patient.   Please feel free to call me with any questions or concerns.  Phone number [phone].

## 2023-10-06 NOTE — PROGRESS NOTES
Please refer to H&P from earlier this morning for detailed plan of care.    Patient seen in ED, currently sleeping and I did not wake him up.    Patient admitted for melena, acute kidney injury.  Patient did received 1 L normal saline bolus and receiving continuous IV fluid, hemoglobin dropped from 7.2-6.3.  However, overnight no hematochezia, melena or hematemesis.  Patient hemodynamically stable.    Ordered 1 unit packed RBC transfusion.  Check hemoglobin 2 hours after transfusion, also check BMP at the same time and this was discussed with patient's nurse.    Discussed with GI team, patient is scheduled for EGD    Discussed with pharmacist, patient has not been filling his medications.    I reviewed previous medical record, seems patient was taking 25 mcg Synthyroid, will resume given elevated TSH and low free T4.    This note is not for billing purposes.      Note created using dragon voice recognition software.  Errors in spelling or words which seems out of context are unintentional.  Sounds alike errors may have escaped editing.    10/6/2023  PABLO MERCADO MD  [facility]  PAGER NO. [phone]

## 2023-10-06 NOTE — ED NOTES
Bed: JNED-31  Expected date:   Expected time:   Means of arrival:   Comments:  Pt returning from surgery

## 2023-10-06 NOTE — ED PROVIDER NOTES
"EMERGENCY DEPARTMENT ENCOUNTER      NAME: Allen Julian  AGE: 78 year old male  YOB: 1945  MRN: [id number]  EVALUATION DATE & TIME: 10/6/2023 12:43 AM    PCP: No primary care provider on file.    ED PROVIDER: Marcello Tobias M.D.     Chief Complaint   Patient presents with    Abdominal Pain     Blood in stool     FINAL IMPRESSION:  1. Gastrointestinal hemorrhage, unspecified gastrointestinal hemorrhage type      ED COURSE & MEDICAL DECISION MAKING:    Pertinent Labs & Imaging studies reviewed. (See chart for details)  78 year old male presents to the Emergency Department for evaluation of abdominal pain and bloody stool.  Earlier this week patient had several episodes of bloody stool described as a mixture of black and bright red.  This seemed to resolve and for the last 2 days the patient has not had any bloody stool.  He has had abdominal pain throughout this process.  Constellation of the symptoms leading the family to bring the patient to the emergency department.  Past medical history hypertension and diabetes.  He does not take any blood thinning medication.  Family reporting this happen once \"20 years ago\".  On examination patient noted to be hypertensive.  Vital signs otherwise unremarkable.  Did have some pallor on examination that was appreciated.  He also had diffuse abdominal pain to palpation without guarding or peritoneal signs.  Differential broad at this point with considerations for bowel obstruction, upper GI bleed, lower GI bleed, bowel obstruction, malignancy, colitis, enteritis or gastritis.  Instituted antacid therapy.  Screening laboratory testing pending.  We will medicate patient's pain.  Plan for CT imaging abdomen pelvis.  Probable admission to the hospital based on current history and exam.    4:28 AM  Patient has been stable here in the emergency department.  No active bleeding is evident.  Vital signs are stable.  The hemoglobin is 7.2 I think indicating fairly significant " bleeding earlier this week.  I suspect upper GI bleed based on patient's history of being on omeprazole and not take that medication along with his abdominal pain.  CT angiogram with no significant findings.  We will plan to admit to the hospital for serial hemoglobins and probable GI consultation.  Admit patient n.p.o. in case intervention is needed later.  At this point I do not think transfusion is indicated with a hemoglobin of 7.2.  Patient's type and screen is obtained and in the event that his hemoglobin worsens he is consented for blood.    12:51 AM I met with the patient, obtained history, performed an initial exam, and discussed options and plan for diagnostics and treatment here in the ED.    At the conclusion of the encounter I discussed the results of all of the tests and the disposition. The questions were     Medical Decision Making    History:  Supplemental history from: Family Member/Significant Other  External Record(s) reviewed: Documented in chart, if applicable.    Work Up:  Chart documentation includes differential considered and any EKGs or imaging independently interpreted by provider, where specified.  In additional to work up documented, I considered the following work up: Documented in chart, if applicable.    External consultation:  Discussion of management with another provider: Documented in chart, if applicable    Complicating factors:  Care impacted by chronic illness: Diabetes and Hypertension  Care affected by social determinants of health: N/A    Disposition considerations: Admit.        MEDICATIONS GIVEN IN THE EMERGENCY:  Medications   sodium chloride 0.9% BOLUS 1,000 mL (0 mLs Intravenous Stopped 10/6/23 0230)   morphine (PF) injection 4 mg (4 mg Intravenous $Given 10/6/23 0122)   ondansetron (ZOFRAN) injection 4 mg (4 mg Intravenous $Given 10/6/23 0122)   pantoprazole (PROTONIX) IV push injection 40 mg (40 mg Intravenous $Given 10/6/23 0301)   iopamidol (ISOVUE-370) solution 75  "mL (75 mLs Intravenous $Given 10/6/23 0220)       NEW PRESCRIPTIONS STARTED AT TODAY'S ER VISIT  New Prescriptions    No medications on file          =================================================================    HPI    Patient information was obtained from: patient and family    Use of : N/A       Allen Julian is a 78 year old male with a pertinent history of DM2 and hypertension who presents to this ED via wheelchair for evaluation of abdominal pain.    Per daughter,  The patient has had \"severe\", diffuse, abdominal pain for the last few days, blood in stool 3 days ago, and has not had a bowel movement in the last 2-3 days. The patient states it was \"quite a bit\" of blood in the stool. The blood was a mixture of bright red and dark/black. He was having abdominal pain at that time as well. He would like pain medications now. He had similar symptoms one other time about 20 years ago. He takes medications for hypertension and diabetes. He has never had a colonoscopy.    He denies lightheaded, dizziness, or any other complaints at this time.       REVIEW OF SYSTEMS   Review of Systems   Gastrointestinal:  Positive for abdominal pain, blood in stool and constipation.   Neurological:  Negative for dizziness and light-headedness.   All other systems reviewed and are negative.     PAST MEDICAL HISTORY:  Hypertension  Diabetes    CURRENT MEDICATIONS:    No current outpatient medications on file.      ALLERGIES:  No Known Allergies    FAMILY HISTORY:  History reviewed. No pertinent family history.    SOCIAL HISTORY:        VITALS:  BP (!) 169/69   Pulse 54   Temp 98.6  F (37  C) (Oral)   Resp 16   Ht 1.524 m (5')   Wt 65.9 kg (145 lb 4.5 oz)   SpO2 95%   BMI 28.37 kg/m      PHYSICAL EXAM    PHYSICAL EXAM    Constitutional: Well developed, Well nourished, appears fatigued   HENT: Normocephalic, Atraumatic, Bilateral external ears normal, Oropharynx normal, mucous membranes moist, Nose normal. Neck-  " Normal range of motion, No tenderness, Supple, No stridor.   Eyes: PERRL, EOMI, Conjunctiva normal, No discharge.   Respiratory: Normal breath sounds, No respiratory distress, No wheezing, Speaks full sentences easily. No cough.   Cardiovascular: Normal heart rate, Regular rhythm, No murmurs Chest wall nontender.    GI: Diffuse abdominal pain to palpation, no guarding or peritoneal signs no pressure or tension or palpable masses.  : No cva tenderness    Musculoskeletal: 2+ DP pulses. No edema. No cyanosis. Good range of motion in all major joints. No tenderness to palpation. No tenderness of the CTLS spine.   Integument: Warm, Dry, No erythema, No rash. No petechiae.   Neurologic: Alert & oriented x 3, Normal motor function, Normal sensory function, No focal deficits noted.   Psychiatric: Affect normal, Judgment normal, Mood normal. Cooperative.     LAB:  All pertinent labs reviewed and interpreted.  Results for orders placed or performed during the hospital encounter of 10/06/23   CTA Abdomen Pelvis with Contrast    Impression    IMPRESSION:  1.  Site of GI bleed not identified.  2.  Stomach is under distended. Wall thickening not excluded.   Basic metabolic panel   Result Value Ref Range    Sodium 137 135 - 145 mmol/L    Potassium 5.1 3.4 - 5.3 mmol/L    Chloride 108 (H) 98 - 107 mmol/L    Carbon Dioxide (CO2) 21 (L) 22 - 29 mmol/L    Anion Gap 8 7 - 15 mmol/L    Urea Nitrogen 48.2 (H) 8.0 - 23.0 mg/dL    Creatinine 1.55 (H) 0.67 - 1.17 mg/dL    GFR Estimate 46 (L) >60 mL/min/1.73m2    Calcium 8.4 (L) 8.8 - 10.2 mg/dL    Glucose 101 (H) 70 - 99 mg/dL   Result Value Ref Range    Lipase 22 13 - 60 U/L   Lactic acid whole blood   Result Value Ref Range    Lactic Acid 0.8 0.7 - 2.0 mmol/L   Result Value Ref Range    Magnesium 2.1 1.7 - 2.3 mg/dL   UA with Microscopic reflex to Culture    Specimen: Urine, Midstream   Result Value Ref Range    Color Urine Colorless Colorless, Straw, Light Yellow, Yellow    Appearance  Urine Clear Clear    Glucose Urine Negative Negative mg/dL    Bilirubin Urine Negative Negative    Ketones Urine Negative Negative mg/dL    Specific Gravity Urine 1.013 1.001 - 1.030    Blood Urine Negative Negative    pH Urine 5.0 5.0 - 7.0    Protein Albumin Urine Negative Negative mg/dL    Urobilinogen Urine <2.0 <2.0 mg/dL    Nitrite Urine Negative Negative    Leukocyte Esterase Urine Negative Negative    RBC Urine 0 <=2 /HPF    WBC Urine <1 <=5 /HPF   CBC with platelets and differential   Result Value Ref Range    WBC Count 11.0 4.0 - 11.0 10e3/uL    RBC Count 2.32 (L) 4.40 - 5.90 10e6/uL    Hemoglobin 7.2 (L) 13.3 - 17.7 g/dL    Hematocrit 22.0 (L) 40.0 - 53.0 %    MCV 95 78 - 100 fL    MCH 31.0 26.5 - 33.0 pg    MCHC 32.7 31.5 - 36.5 g/dL    RDW 14.6 10.0 - 15.0 %    Platelet Count 191 150 - 450 10e3/uL    % Neutrophils 70 %    % Lymphocytes 10 %    % Monocytes 11 %    Mids % (Monos, Eos, Basos)      % Eosinophils 8 %    % Basophils 0 %    % Immature Granulocytes 1 %    NRBCs per 100 WBC 0 <1 /100    Absolute Neutrophils 7.6 1.6 - 8.3 10e3/uL    Absolute Lymphocytes 1.1 0.8 - 5.3 10e3/uL    Absolute Monocytes 1.2 0.0 - 1.3 10e3/uL    Mids Abs (Monos, Eos, Basos)      Absolute Eosinophils 0.9 (H) 0.0 - 0.7 10e3/uL    Absolute Basophils 0.0 0.0 - 0.2 10e3/uL    Absolute Immature Granulocytes 0.1 <=0.4 10e3/uL    Absolute NRBCs 0.0 10e3/uL   ABO and Rh   Result Value Ref Range    SPECIMEN EXPIRATION DATE [id number]        RADIOLOGY:  Reviewed all pertinent imaging. Please see official radiology report.  CTA Abdomen Pelvis with Contrast   Final Result   IMPRESSION:   1.  Site of GI bleed not identified.   2.  Stomach is under distended. Wall thickening not excluded.            IRobbin, am serving as a scribe to document services personally performed by Marcello Tobias M.D. based on my observation and the provider's statements to me. I, Marcello Tobias M.D., attest that Robbin Agrawal is acting in a  dank neumann, has observed my performance of the services and has documented them in accordance with my direction.    Marcello Tobias M.D.  [facility] EMERGENCY DEPARTMENT  [address] [place] [address] [id number] [phone]       Marcello Tobias MD  10/06/23 0429

## 2023-10-07 ENCOUNTER — APPOINTMENT (OUTPATIENT)
Dept: CARDIOLOGY | Facility: HOSPITAL | Age: 78
DRG: 378 | End: 2023-10-07
Attending: INTERNAL MEDICINE
Payer: COMMERCIAL

## 2023-10-07 LAB
ANION GAP SERPL CALCULATED.3IONS-SCNC: 4 MMOL/L (ref 7–15)
BLD PROD TYP BPU: NORMAL
BLOOD COMPONENT TYPE: NORMAL
BUN SERPL-MCNC: 29.9 MG/DL (ref 8–23)
CALCIUM SERPL-MCNC: 7.8 MG/DL (ref 8.8–10.2)
CHLORIDE SERPL-SCNC: 113 MMOL/L (ref 98–107)
CODING SYSTEM: NORMAL
CREAT SERPL-MCNC: 1.61 MG/DL (ref 0.67–1.17)
CROSSMATCH: NORMAL
DEPRECATED HCO3 PLAS-SCNC: 22 MMOL/L (ref 22–29)
EGFRCR SERPLBLD CKD-EPI 2021: 44 ML/MIN/1.73M2
ERYTHROCYTE [DISTWIDTH] IN BLOOD BY AUTOMATED COUNT: 15 % (ref 10–15)
GLUCOSE SERPL-MCNC: 76 MG/DL (ref 70–99)
HCT VFR BLD AUTO: 23 % (ref 40–53)
HGB BLD-MCNC: 7.1 G/DL (ref 13.3–17.7)
ISSUE DATE AND TIME: NORMAL
LVEF ECHO: NORMAL
MAGNESIUM SERPL-MCNC: 2.2 MG/DL (ref 1.7–2.3)
MCH RBC QN AUTO: 29.7 PG (ref 26.5–33)
MCHC RBC AUTO-ENTMCNC: 30.9 G/DL (ref 31.5–36.5)
MCV RBC AUTO: 96 FL (ref 78–100)
PLATELET # BLD AUTO: 195 10E3/UL (ref 150–450)
POTASSIUM SERPL-SCNC: 5.1 MMOL/L (ref 3.4–5.3)
RBC # BLD AUTO: 2.39 10E6/UL (ref 4.4–5.9)
SODIUM SERPL-SCNC: 139 MMOL/L (ref 135–145)
UNIT ABO/RH: NORMAL
UNIT NUMBER: NORMAL
UNIT STATUS: NORMAL
UNIT TYPE ISBT: 5100
UPPER GI ENDOSCOPY: NORMAL
WBC # BLD AUTO: 7.9 10E3/UL (ref 4–11)

## 2023-10-07 PROCEDURE — 83735 ASSAY OF MAGNESIUM: CPT | Performed by: INTERNAL MEDICINE

## 2023-10-07 PROCEDURE — 93356 MYOCRD STRAIN IMG SPCKL TRCK: CPT | Performed by: INTERNAL MEDICINE

## 2023-10-07 PROCEDURE — 85027 COMPLETE CBC AUTOMATED: CPT | Performed by: INTERNAL MEDICINE

## 2023-10-07 PROCEDURE — 370N000017 HC ANESTHESIA TECHNICAL FEE, PER MIN: Performed by: INTERNAL MEDICINE

## 2023-10-07 PROCEDURE — 250N000011 HC RX IP 250 OP 636: Performed by: INTERNAL MEDICINE

## 2023-10-07 PROCEDURE — 93005 ELECTROCARDIOGRAM TRACING: CPT

## 2023-10-07 PROCEDURE — 80048 BASIC METABOLIC PNL TOTAL CA: CPT | Performed by: INTERNAL MEDICINE

## 2023-10-07 PROCEDURE — 0DB78ZX EXCISION OF STOMACH, PYLORUS, VIA NATURAL OR ARTIFICIAL OPENING ENDOSCOPIC, DIAGNOSTIC: ICD-10-PCS | Performed by: INTERNAL MEDICINE

## 2023-10-07 PROCEDURE — P9016 RBC LEUKOCYTES REDUCED: HCPCS | Performed by: INTERNAL MEDICINE

## 2023-10-07 PROCEDURE — 36415 COLL VENOUS BLD VENIPUNCTURE: CPT | Performed by: INTERNAL MEDICINE

## 2023-10-07 PROCEDURE — 999N000141 HC STATISTIC PRE-PROCEDURE NURSING ASSESSMENT: Performed by: INTERNAL MEDICINE

## 2023-10-07 PROCEDURE — 93356 MYOCRD STRAIN IMG SPCKL TRCK: CPT

## 2023-10-07 PROCEDURE — 258N000003 HC RX IP 258 OP 636: Performed by: ANESTHESIOLOGY

## 2023-10-07 PROCEDURE — 120N000001 HC R&B MED SURG/OB

## 2023-10-07 PROCEDURE — 250N000011 HC RX IP 250 OP 636: Mod: JZ | Performed by: HOSPITALIST

## 2023-10-07 PROCEDURE — 250N000011 HC RX IP 250 OP 636: Performed by: NURSE ANESTHETIST, CERTIFIED REGISTERED

## 2023-10-07 PROCEDURE — 99232 SBSQ HOSP IP/OBS MODERATE 35: CPT | Performed by: INTERNAL MEDICINE

## 2023-10-07 PROCEDURE — 272N000001 HC OR GENERAL SUPPLY STERILE: Performed by: INTERNAL MEDICINE

## 2023-10-07 PROCEDURE — 93010 ELECTROCARDIOGRAM REPORT: CPT | Performed by: INTERNAL MEDICINE

## 2023-10-07 PROCEDURE — 88342 IMHCHEM/IMCYTCHM 1ST ANTB: CPT | Mod: 26 | Performed by: PATHOLOGY

## 2023-10-07 PROCEDURE — 360N000075 HC SURGERY LEVEL 2, PER MIN: Performed by: INTERNAL MEDICINE

## 2023-10-07 PROCEDURE — 93306 TTE W/DOPPLER COMPLETE: CPT | Mod: 26 | Performed by: INTERNAL MEDICINE

## 2023-10-07 PROCEDURE — 93005 ELECTROCARDIOGRAM TRACING: CPT | Performed by: INTERNAL MEDICINE

## 2023-10-07 PROCEDURE — 250N000013 HC RX MED GY IP 250 OP 250 PS 637: Performed by: INTERNAL MEDICINE

## 2023-10-07 PROCEDURE — 88342 IMHCHEM/IMCYTCHM 1ST ANTB: CPT | Mod: TC | Performed by: INTERNAL MEDICINE

## 2023-10-07 PROCEDURE — C9113 INJ PANTOPRAZOLE SODIUM, VIA: HCPCS | Mod: JZ | Performed by: HOSPITALIST

## 2023-10-07 PROCEDURE — 88305 TISSUE EXAM BY PATHOLOGIST: CPT | Mod: 26 | Performed by: PATHOLOGY

## 2023-10-07 RX ORDER — OXYCODONE HYDROCHLORIDE 5 MG/1
5 TABLET ORAL
Status: DISCONTINUED | OUTPATIENT
Start: 2023-10-07 | End: 2023-10-08 | Stop reason: HOSPADM

## 2023-10-07 RX ORDER — FENTANYL CITRATE 50 UG/ML
50 INJECTION, SOLUTION INTRAMUSCULAR; INTRAVENOUS EVERY 5 MIN PRN
Status: DISCONTINUED | OUTPATIENT
Start: 2023-10-07 | End: 2023-10-07 | Stop reason: HOSPADM

## 2023-10-07 RX ORDER — FENTANYL CITRATE 50 UG/ML
25 INJECTION, SOLUTION INTRAMUSCULAR; INTRAVENOUS EVERY 5 MIN PRN
Status: DISCONTINUED | OUTPATIENT
Start: 2023-10-07 | End: 2023-10-07 | Stop reason: HOSPADM

## 2023-10-07 RX ORDER — LIDOCAINE 40 MG/G
CREAM TOPICAL
Status: DISCONTINUED | OUTPATIENT
Start: 2023-10-07 | End: 2023-10-07 | Stop reason: HOSPADM

## 2023-10-07 RX ORDER — ONDANSETRON 4 MG/1
4 TABLET, ORALLY DISINTEGRATING ORAL EVERY 30 MIN PRN
Status: DISCONTINUED | OUTPATIENT
Start: 2023-10-07 | End: 2023-10-07 | Stop reason: HOSPADM

## 2023-10-07 RX ORDER — OXYCODONE HYDROCHLORIDE 5 MG/1
10 TABLET ORAL
Status: DISCONTINUED | OUTPATIENT
Start: 2023-10-07 | End: 2023-10-08 | Stop reason: HOSPADM

## 2023-10-07 RX ORDER — ONDANSETRON 4 MG/1
4 TABLET, ORALLY DISINTEGRATING ORAL EVERY 30 MIN PRN
Status: DISCONTINUED | OUTPATIENT
Start: 2023-10-07 | End: 2023-10-08 | Stop reason: HOSPADM

## 2023-10-07 RX ORDER — ONDANSETRON 2 MG/ML
4 INJECTION INTRAMUSCULAR; INTRAVENOUS EVERY 30 MIN PRN
Status: DISCONTINUED | OUTPATIENT
Start: 2023-10-07 | End: 2023-10-08 | Stop reason: HOSPADM

## 2023-10-07 RX ORDER — HYDROMORPHONE HYDROCHLORIDE 1 MG/ML
0.2 INJECTION, SOLUTION INTRAMUSCULAR; INTRAVENOUS; SUBCUTANEOUS EVERY 5 MIN PRN
Status: DISCONTINUED | OUTPATIENT
Start: 2023-10-07 | End: 2023-10-07 | Stop reason: HOSPADM

## 2023-10-07 RX ORDER — SODIUM CHLORIDE, SODIUM LACTATE, POTASSIUM CHLORIDE, CALCIUM CHLORIDE 600; 310; 30; 20 MG/100ML; MG/100ML; MG/100ML; MG/100ML
INJECTION, SOLUTION INTRAVENOUS CONTINUOUS
Status: DISCONTINUED | OUTPATIENT
Start: 2023-10-07 | End: 2023-10-07 | Stop reason: HOSPADM

## 2023-10-07 RX ORDER — PROPOFOL 10 MG/ML
INJECTION, EMULSION INTRAVENOUS CONTINUOUS PRN
Status: DISCONTINUED | OUTPATIENT
Start: 2023-10-07 | End: 2023-10-07

## 2023-10-07 RX ORDER — EPINEPHRINE 1 MG/ML(1)
AMPUL (ML) INJECTION PRN
Status: DISCONTINUED | OUTPATIENT
Start: 2023-10-07 | End: 2023-10-07 | Stop reason: HOSPADM

## 2023-10-07 RX ORDER — AMLODIPINE BESYLATE 2.5 MG/1
2.5 TABLET ORAL DAILY
Status: DISCONTINUED | OUTPATIENT
Start: 2023-10-07 | End: 2023-10-08 | Stop reason: HOSPADM

## 2023-10-07 RX ORDER — ONDANSETRON 2 MG/ML
4 INJECTION INTRAMUSCULAR; INTRAVENOUS EVERY 30 MIN PRN
Status: DISCONTINUED | OUTPATIENT
Start: 2023-10-07 | End: 2023-10-07 | Stop reason: HOSPADM

## 2023-10-07 RX ORDER — ROSUVASTATIN CALCIUM 10 MG/1
10 TABLET, COATED ORAL AT BEDTIME
Status: DISCONTINUED | OUTPATIENT
Start: 2023-10-07 | End: 2023-10-08 | Stop reason: HOSPADM

## 2023-10-07 RX ORDER — HYDROMORPHONE HYDROCHLORIDE 1 MG/ML
0.4 INJECTION, SOLUTION INTRAMUSCULAR; INTRAVENOUS; SUBCUTANEOUS EVERY 5 MIN PRN
Status: DISCONTINUED | OUTPATIENT
Start: 2023-10-07 | End: 2023-10-07 | Stop reason: HOSPADM

## 2023-10-07 RX ORDER — ONDANSETRON 2 MG/ML
INJECTION INTRAMUSCULAR; INTRAVENOUS PRN
Status: DISCONTINUED | OUTPATIENT
Start: 2023-10-07 | End: 2023-10-07

## 2023-10-07 RX ADMIN — PANTOPRAZOLE SODIUM 40 MG: 40 INJECTION, POWDER, FOR SOLUTION INTRAVENOUS at 20:58

## 2023-10-07 RX ADMIN — ROSUVASTATIN CALCIUM 10 MG: 10 TABLET, FILM COATED ORAL at 20:58

## 2023-10-07 RX ADMIN — SODIUM CHLORIDE, POTASSIUM CHLORIDE, SODIUM LACTATE AND CALCIUM CHLORIDE: 600; 310; 30; 20 INJECTION, SOLUTION INTRAVENOUS at 08:34

## 2023-10-07 RX ADMIN — LEVOTHYROXINE SODIUM 25 MCG: 0.03 TABLET ORAL at 10:26

## 2023-10-07 RX ADMIN — HYDRALAZINE HYDROCHLORIDE 10 MG: 20 INJECTION INTRAMUSCULAR; INTRAVENOUS at 10:59

## 2023-10-07 RX ADMIN — PROPOFOL 200 MCG/KG/MIN: 10 INJECTION, EMULSION INTRAVENOUS at 09:20

## 2023-10-07 RX ADMIN — AMLODIPINE BESYLATE 2.5 MG: 2.5 TABLET ORAL at 10:56

## 2023-10-07 RX ADMIN — ONDANSETRON 4 MG: 2 INJECTION INTRAMUSCULAR; INTRAVENOUS at 09:30

## 2023-10-07 RX ADMIN — PANTOPRAZOLE SODIUM 40 MG: 40 INJECTION, POWDER, FOR SOLUTION INTRAVENOUS at 10:26

## 2023-10-07 ASSESSMENT — ACTIVITIES OF DAILY LIVING (ADL)
ADLS_ACUITY_SCORE: 23

## 2023-10-07 NOTE — PLAN OF CARE
Problem: Plan of Care - These are the overarching goals to be used throughout the patient stay.    Goal: Optimal Comfort and Wellbeing  Outcome: Progressing     Problem: Gastrointestinal Bleeding  Goal: Optimal Coping with Acute Illness  Outcome: Progressing  Goal: Hemostasis  Outcome: Progressing     Goal Outcome Evaluation:         Pt denies pain. Denies any episodes of bleeding. No bowel movement during shift. Abdomen slightly rounded. Voiding in urinal without difficulty. Tolerated clears during the evening. Will be NPO at midnight for EGD.

## 2023-10-07 NOTE — H&P
H&P    Please see consult note of yesterday.  There have been no significant changes.    ASA 2    Airway is intact.  Chest is clear to auscultation.  Cardiovascular exam shows a regular rate and rhythm.    There is nothing on history or physical to preclude this exam.  EGD for GI bleed is anticipated.    Osiel Stinson MD  [facility]

## 2023-10-07 NOTE — PLAN OF CARE
Problem: Plan of Care - These are the overarching goals to be used throughout the patient stay.    Goal: Optimal Comfort and Wellbeing  Outcome: Progressing     Problem: Risk for Delirium  Goal: Improved Sleep  Outcome: Progressing     Problem: Gastrointestinal Bleeding  Goal: Optimal Coping with Acute Illness  Outcome: Progressing   Goal Outcome Evaluation:       Pt is A&O x4. Pt denies pain/nausea. Pt appears to be asleep for majority of shift. Pt son present at bedside. Son acting as . Pt has been NPO since midnight. Pt able to make needs known.   Wayne Moran RN

## 2023-10-07 NOTE — ANESTHESIA PREPROCEDURE EVALUATION
Anesthesia Pre-Procedure Evaluation    Patient: Allen Julian   MRN: [id number] : 1945        Procedure : Procedure(s):  ESOPHAGOGASTRODUODENOSCOPY          History reviewed. No pertinent past medical history.   History reviewed. No pertinent surgical history.   No Known Allergies   Social History     Tobacco Use    Smoking status: Never    Smokeless tobacco: Never   Substance Use Topics    Alcohol use: Never      Wt Readings from Last 1 Encounters:   10/06/23 65.3 kg (143 lb 15.4 oz)        Anesthesia Evaluation            ROS/MED HX  ENT/Pulmonary:  - neg pulmonary ROS     Neurologic:  - neg neurologic ROS     Cardiovascular:     (+)  hypertension- -   -  - -                                      METS/Exercise Tolerance:     Hematologic:     (+)      anemia,          Musculoskeletal:  - neg musculoskeletal ROS     GI/Hepatic: Comment: Presumed GI bleed. Hgb 7.1      Renal/Genitourinary:  - neg Renal ROS   (+) renal disease,             Endo:  - neg endo ROS   (+)          thyroid problem,            Psychiatric/Substance Use:       Infectious Disease:       Malignancy:       Other:            Physical Exam    Airway  airway exam normal      Mallampati: III   TM distance: > 3 FB   Neck ROM: full   Mouth opening: > 3 cm    Respiratory Devices and Support         Dental       (+) Multiple visibly decayed, broken teeth      Cardiovascular   cardiovascular exam normal          Pulmonary   pulmonary exam normal                OUTSIDE LABS:  CBC:   Lab Results   Component Value Date    WBC 7.9 10/07/2023    WBC 11.0 10/06/2023    HGB 7.1 (L) 10/07/2023    HGB 7.4 (L) 10/06/2023    HCT 23.0 (L) 10/07/2023    HCT 22.0 (L) 10/06/2023     10/07/2023     10/06/2023     BMP:   Lab Results   Component Value Date     10/06/2023     10/06/2023    POTASSIUM 5.3 10/06/2023    POTASSIUM 5.4 (H) 10/06/2023    CHLORIDE 112 (H) 10/06/2023    CHLORIDE 108 (H) 10/06/2023    CO2 20 (L) 10/06/2023    CO2 21 (L)  10/06/2023    BUN 38.3 (H) 10/06/2023    BUN 48.2 (H) 10/06/2023    CR 1.49 (H) 10/06/2023    CR 1.55 (H) 10/06/2023    GLC 85 10/06/2023    GLC 87 10/06/2023     COAGS: No results found for: PTT, INR, FIBR  POC: No results found for: BGM, HCG, HCGS  HEPATIC: No results found for: ALBUMIN, PROTTOTAL, ALT, AST, GGT, ALKPHOS, BILITOTAL, BILIDIRECT, LAVON  OTHER:   Lab Results   Component Value Date    LACT 0.8 10/06/2023    A1C 5.7 (H) 10/06/2023    SAVI 8.0 (L) 10/06/2023    MAG 2.1 10/06/2023    LIPASE 22 10/06/2023    TSH 17.51 (H) 10/06/2023    T4 0.74 (L) 10/06/2023       Anesthesia Plan    ASA Status:  3    NPO Status:  NPO Appropriate    Anesthesia Type: MAC.     - Reason for MAC: immobility needed, straight local not clinically adequate              Consents    Anesthesia Plan(s) and associated risks, benefits, and realistic alternatives discussed. Questions answered and patient/representative(s) expressed understanding.     - Discussed:     - Discussed with:  Patient,  (Son present)      - Extended Intubation/Ventilatory Support Discussed: No.      - Patient is DNR/DNI Status: No          Postoperative Care    Pain management: Multi-modal analgesia.   PONV prophylaxis: Ondansetron (or other 5HT-3)     Comments:                Myrna Morel MD

## 2023-10-07 NOTE — PROGRESS NOTES
Report called to [facility]O. Pt to go to OR soon.     SB on Tele this am; HR 49; /89; Dr. MEYERS updated. Pt asymptomatic. EKG ordered.

## 2023-10-07 NOTE — PROGRESS NOTES
[facility]    Medicine Progress Note - Hospitalist Service    Date of Admission:  10/6/2023    Assessment & Plan    Allen Julian is a 78 year old male with past medical history of hypertension, hypothyroidism who presented to ED for evaluation of blackish stool.  Patient found to have anemia, acute kidney injury, elevated TSH.  Patient admitted for further management.    Acute upper GI bleeding;  -- On admission, CTA abdomen pelvis negative for active GI bleeding  --On 10/7, EGD reported gastric and duodenal ulcer, reported EGD results for details.  Personally discussed with gastroenterologist  --Continue PPI  -- Advance diet as tolerated    Acute blood loss anemia due to GI loss;  -- Hemoglobin again trending down, no active/obvious bleeding.  Discussed with GI, will transfuse 1 more unit packed RBC today  --Recheck hemoglobin in a.m.    Acute kidney injury versus CKD;  --No previous labs to compare.    --Continue IV fluid  --Avoid nephrotoxic medications  --BMP in a.m.    Essential hypertension uncontrolled; noncompliant  --Patient supposed to be taking losartan.  However, now CATALINO we will hold  --Initiated on Norvasc, continue    Cardiac murmur;  --Family reported not new.  Check echocardiogram    Hypothyroidism; noncompliant  --Elevated TSH and low free T4.  Reviewed previous record, seems patient was taking Synthyroid, 25 mcg resumed  -Check TSH, reflex Free T4  -A1c 5.7       Diet: Advance Diet as Tolerated: Clear Liquid Diet    DVT Prophylaxis: Pneumatic Compression Devices and Ambulate every shift  Jimenez Catheter: Not present  Lines: None     Cardiac Monitoring: None  Code Status: Full Code      Clinically Significant Risk Factors        # Hyperkalemia: Highest K = 5.4 mmol/L in last 2 days, will monitor as appropriate           # Hypertension: Noted on problem list        # Overweight: Estimated body mass index is 28.12 kg/m  as calculated from the following:    Height as of this  encounter: 1.524 m (5').    Weight as of this encounter: 65.3 kg (143 lb 15.4 oz)., PRESENT ON ADMISSION            Disposition Plan      Expected Discharge Date: 10/08/2023      Destination: home with family            Ace MEYERS MD  Hospitalist Service  [facility]  Securely message with [employer] (more info)  Text page via [employer] Paging/Directory   ______________________________________________________________________    Interval History   Patient seen and examined.  Notes, labs, imaging report personally reviewed.  Patient examined after his EGD.  Denied feeling chest pain, shortness of breath, abdomen pain, nausea, vomiting.  Professional  used at bedside.  Detailed plan of care discussed with patient's multiple family members.  Discussed with gastroenterologist.    Physical Exam   Vital Signs: Temp: 97.9  F (36.6  C) Temp src: Oral BP: (!) 187/77 Pulse: 57   Resp: 16 SpO2: 96 % O2 Device: None (Room air)    Weight: 143 lbs 15.37 oz      General: Not in obvious distress.  HEENT: Normocephalic, supple neck  Chest: Clear to auscultation bilateral anteriorly, no wheezing  Heart: S1S2 normal, regular  Abdomen: Soft. NT, ND. Bowel sounds- active.  Extremities: No legs swelling  Neuro: alert and awake, grossly non-focal        Medical Decision Making             Data     I have personally reviewed the following data over the past 24 hrs:    7.9  \   7.1 (L)   / 195     139 113 (H) 29.9 (H) /  76   5.1 22 1.61 (H) \     Ferritin:  N/A % Retic:  N/A LDH:  N/A       Imaging results reviewed over the past 24 hrs:   No results found for this or any previous visit (from the past 24 hour(s)).

## 2023-10-07 NOTE — ANESTHESIA CARE TRANSFER NOTE
Patient: Allen N Pha    Procedure: Procedure(s):  ESOPHAGOGASTRODUODENOSCOPY       Diagnosis: Gastrointestinal hemorrhage, unspecified gastrointestinal hemorrhage type [K92.2]  Diagnosis Additional Information: No value filed.    Anesthesia Type:   MAC     Note:    Oropharynx: oropharynx clear of all foreign objects and spontaneously breathing  Level of Consciousness: drowsy  Oxygen Supplementation: room air    Independent Airway: airway patency satisfactory and stable  Dentition: dentition unchanged  Vital Signs Stable: post-procedure vital signs reviewed and stable  Report to RN Given: handoff report given  Patient transferred to: Medical/Surgical Unit    Handoff Report: Identifed the Patient, Identified the Reponsible Provider, Reviewed the pertinent medical history, Discussed the surgical course, Reviewed Intra-OP anesthesia mangement and issues during anesthesia, Set expectations for post-procedure period and Allowed opportunity for questions and acknowledgement of understanding      Vitals:  Vitals Value Taken Time   /67 10/07/23  0942   Temp 36 10/07/23  0942   Pulse 53 10/07/23  0942   Resp 12 10/07/23  0942   SpO2 97 10/07/23  0942       Electronically Signed By: DONALD Rasheed CRNA  October 7, 2023  9:37 AM

## 2023-10-07 NOTE — ANESTHESIA POSTPROCEDURE EVALUATION
Patient: Allen N Pha    Procedure: Procedure(s):  ESOPHAGOGASTRODUODENOSCOPY       Anesthesia Type:  MAC    Note:  Disposition: Inpatient   Postop Pain Control: Uneventful            Sign Out: Well controlled pain   PONV: No   Neuro/Psych: Uneventful            Sign Out: Acceptable/Baseline neuro status   Airway/Respiratory: Uneventful            Sign Out: Acceptable/Baseline resp. status   CV/Hemodynamics: Uneventful            Sign Out: Acceptable CV status; No obvious hypovolemia; No obvious fluid overload   Other NRE: NONE   DID A NON-ROUTINE EVENT OCCUR?            Last vitals:  Vitals:    10/07/23 0738 10/07/23 1004 10/07/23 1133   BP: (!) 172/89 (!) 187/77 (!) 148/67   Pulse: 53 57 64   Resp: 24 16 16   Temp: 36.7  C (98.1  F) 36.6  C (97.9  F) 36.9  C (98.4  F)   SpO2: 95% 96% 96%       Electronically Signed By: Myrna Morel MD  October 7, 2023  11:45 AM

## 2023-10-08 VITALS
HEIGHT: 60 IN | SYSTOLIC BLOOD PRESSURE: 152 MMHG | TEMPERATURE: 98.4 F | BODY MASS INDEX: 28.26 KG/M2 | OXYGEN SATURATION: 98 % | WEIGHT: 143.96 LBS | RESPIRATION RATE: 20 BRPM | HEART RATE: 51 BPM | DIASTOLIC BLOOD PRESSURE: 68 MMHG

## 2023-10-08 LAB
ALBUMIN SERPL BCG-MCNC: 3 G/DL (ref 3.5–5.2)
ALP SERPL-CCNC: 65 U/L (ref 40–129)
ALT SERPL W P-5'-P-CCNC: 16 U/L (ref 0–70)
ANION GAP SERPL CALCULATED.3IONS-SCNC: 6 MMOL/L (ref 7–15)
AST SERPL W P-5'-P-CCNC: 19 U/L (ref 0–45)
BILIRUB DIRECT SERPL-MCNC: <0.2 MG/DL (ref 0–0.3)
BILIRUB SERPL-MCNC: 0.6 MG/DL
BUN SERPL-MCNC: 27.1 MG/DL (ref 8–23)
CALCIUM SERPL-MCNC: 8.2 MG/DL (ref 8.8–10.2)
CHLORIDE SERPL-SCNC: 110 MMOL/L (ref 98–107)
CREAT SERPL-MCNC: 1.75 MG/DL (ref 0.67–1.17)
DEPRECATED HCO3 PLAS-SCNC: 24 MMOL/L (ref 22–29)
EGFRCR SERPLBLD CKD-EPI 2021: 39 ML/MIN/1.73M2
GLUCOSE SERPL-MCNC: 97 MG/DL (ref 70–99)
HGB BLD-MCNC: 9.3 G/DL (ref 13.3–17.7)
POTASSIUM SERPL-SCNC: 5.1 MMOL/L (ref 3.4–5.3)
PROT SERPL-MCNC: 5.3 G/DL (ref 6.4–8.3)
SODIUM SERPL-SCNC: 140 MMOL/L (ref 135–145)

## 2023-10-08 PROCEDURE — 80053 COMPREHEN METABOLIC PANEL: CPT | Performed by: INTERNAL MEDICINE

## 2023-10-08 PROCEDURE — 250N000013 HC RX MED GY IP 250 OP 250 PS 637: Performed by: INTERNAL MEDICINE

## 2023-10-08 PROCEDURE — 36415 COLL VENOUS BLD VENIPUNCTURE: CPT | Performed by: INTERNAL MEDICINE

## 2023-10-08 PROCEDURE — 82248 BILIRUBIN DIRECT: CPT | Performed by: INTERNAL MEDICINE

## 2023-10-08 PROCEDURE — 99239 HOSP IP/OBS DSCHRG MGMT >30: CPT | Performed by: INTERNAL MEDICINE

## 2023-10-08 PROCEDURE — 250N000011 HC RX IP 250 OP 636: Mod: JZ | Performed by: HOSPITALIST

## 2023-10-08 PROCEDURE — 85018 HEMOGLOBIN: CPT | Performed by: INTERNAL MEDICINE

## 2023-10-08 PROCEDURE — C9113 INJ PANTOPRAZOLE SODIUM, VIA: HCPCS | Mod: JZ | Performed by: HOSPITALIST

## 2023-10-08 RX ORDER — LEVOTHYROXINE SODIUM 25 UG/1
25 TABLET ORAL
Qty: 30 TABLET | Refills: 1 | Status: SHIPPED | OUTPATIENT
Start: 2023-10-09 | End: 2024-03-17

## 2023-10-08 RX ORDER — AMLODIPINE BESYLATE 2.5 MG/1
5 TABLET ORAL DAILY
Qty: 30 TABLET | Refills: 1 | Status: SHIPPED | OUTPATIENT
Start: 2023-10-09 | End: 2024-03-17

## 2023-10-08 RX ORDER — PANTOPRAZOLE SODIUM 40 MG/1
40 TABLET, DELAYED RELEASE ORAL 2 TIMES DAILY
Qty: 60 TABLET | Refills: 0 | Status: ON HOLD | OUTPATIENT
Start: 2023-10-08 | End: 2024-03-23

## 2023-10-08 RX ADMIN — LEVOTHYROXINE SODIUM 25 MCG: 0.03 TABLET ORAL at 08:29

## 2023-10-08 RX ADMIN — HYDRALAZINE HYDROCHLORIDE 10 MG: 20 INJECTION INTRAMUSCULAR; INTRAVENOUS at 08:29

## 2023-10-08 RX ADMIN — AMLODIPINE BESYLATE 2.5 MG: 2.5 TABLET ORAL at 08:29

## 2023-10-08 RX ADMIN — PANTOPRAZOLE SODIUM 40 MG: 40 INJECTION, POWDER, FOR SOLUTION INTRAVENOUS at 08:35

## 2023-10-08 ASSESSMENT — ACTIVITIES OF DAILY LIVING (ADL)
ADLS_ACUITY_SCORE: 23
ADLS_ACUITY_SCORE: 23
ADLS_ACUITY_SCORE: 27
ADLS_ACUITY_SCORE: 23

## 2023-10-08 NOTE — DISCHARGE SUMMARY
[facility] MEDICINE  DISCHARGE SUMMARY     Primary Care Physician: Katie Cox  Admission Date: 10/6/2023   Discharge Provider: MEERA Jerome Discharge Date: 10/8/2023   Diet: as below   Code Status: Full Code   Activity: DCACTIVITY: Activity as tolerated        Condition at Discharge: Stable     REASON FOR PRESENTATION(See Admission Note for Details)   Abdominal pain, melena    PRINCIPAL & ACTIVE DISCHARGE DIAGNOSES     Principal Problem:    Gastrointestinal hemorrhage, unspecified gastrointestinal hemorrhage type  Active Problems:    CATALINO (acute kidney injury) (H24)    Anemia due to blood loss, acute    Essential hypertension, benign      PENDING LABS     Unresulted Labs Ordered in the Past 30 Days of this Admission       No orders found from 9/6/2023 to 10/7/2023.              PROCEDURES ( this hospitalization only)      Procedure(s):  ESOPHAGOGASTRODUODENOSCOPY    RECOMMENDATIONS TO OUTPATIENT PROVIDER FOR F/U VISIT     Follow-up Appointments     Follow-up and recommended labs and tests       Follow up with primary care provider, Katie Cox, within 7 days for   hospital follow- up.  The following labs/tests are recommended: hgb.                DISPOSITION     Home    SUMMARY OF HOSPITAL COURSE:    Allen Julian is a 78 year old male with past medical history of hypertension, hypothyroidism who presented to ED for evaluation of blackish stool.  Patient found to have anemia, acute kidney injury, elevated TSH.  Patient admitted for further management.     Acute upper GI bleeding;  -- On admission, CTA abdomen pelvis negative for active GI bleeding  -- On 10/7, EGD reported gastric and duodenal ulcer, reported EGD results for details.  Personally discussed with gastroenterologist, ok to discharge home on PPI twice a day  -- Continue PPI  -- Advanced diet as tolerated. Tolerated regular diet     Acute blood loss anemia due to GI loss;  -- The lowest Hgb was 6.3. Transfused 2  units of pRBCs. Hgb 9.3 today     Acute kidney injury versus CKD;  --No previous labs to compare.    --Continue IV fluid  --Avoid nephrotoxic medications  --BMP in a.m.     Essential hypertension uncontrolled; noncompliant  --Patient supposed to be taking losartan.  However, now on hold due to renal dysfunction  --Initiated on Norvasc. Consider resuming losartan if/when renal function improves     Cardiac murmur;  --Family reported not new.   --Echo showed severe aortic regurgitation, severe aortic root enlargement at 5.2cm. Discussed this with family members (including daughters). I offered to make referral to valve clinic upon discharge. They said, they will talk to his primary MD first.      Hypothyroidism; noncompliant  --Elevated TSH and low free T4.  Reviewed previous record, seems patient was taking Synthyroid, 25 mcg resumed  --Check TSH, reflex Free T4      Discharge Medications with Med changes:     Discharge Medication List as of 10/8/2023 10:28 AM        START taking these medications    Details   amLODIPine (NORVASC) 2.5 MG tablet Take 2 tablets (5 mg) by mouth daily, Disp-30 tablet, R-1, E-Prescribe      levothyroxine (SYNTHROID/LEVOTHROID) 25 MCG tablet Take 1 tablet (25 mcg) by mouth every morning (before breakfast), Disp-30 tablet, R-1, E-Prescribe      pantoprazole (PROTONIX) 40 MG EC tablet Take 1 tablet (40 mg) by mouth 2 times daily, Disp-60 tablet, R-0, E-Prescribe           CONTINUE these medications which have NOT CHANGED    Details   acetaminophen (TYLENOL) 325 MG tablet Take 325-650 mg by mouth every 6 hours as needed for mild pain, Historical      ferrous sulfate (FE TABS) 325 (65 Fe) MG EC tablet Take 325 mg by mouth daily, Historical      rosuvastatin (CRESTOR) 10 MG tablet Take 10 mg by mouth daily, Historical           STOP taking these medications       losartan (COZAAR) 50 MG tablet Comments:   Reason for Stopping:         omeprazole (PRILOSEC) 20 MG DR capsule Comments:   Reason for  Stopping:                     Rationale for medication changes:      Please see above        Consults   GI      Immunizations given this encounter     Most Recent Immunizations   Administered Date(s) Administered     COVID-19 Bivalent 12+ (Pfizer) 11/17/2022     COVID-19 MONOVALENT 12+ (Pfizer) 12/28/2021     COVID-19 Monovalent 12+ (Pfizer 2022) 08/27/2022     COVID-19 Monovalent 18+ (Moderna) 04/17/2021           Anticoagulation Information      Recent INR results: No results for input(s): INR in the last 168 hours.  Warfarin doses (if applicable) or name of other anticoagulant: NA      SIGNIFICANT IMAGING FINDINGS     Results for orders placed or performed during the hospital encounter of 10/06/23   CTA Abdomen Pelvis with Contrast    Impression    IMPRESSION:  1.  Site of GI bleed not identified.  2.  Stomach is under distended. Wall thickening not excluded.   Echocardiogram Complete   Result Value Ref Range    LVEF  55-60%        SIGNIFICANT LABORATORY FINDINGS     Most Recent 3 CBC's:  Recent Labs   Lab Test 10/08/23  0913 10/07/23  0752 10/06/23  2035 10/06/23  0737 10/06/23  0118   WBC  --  7.9  --   --  11.0   HGB 9.3* 7.1* 7.4*   < > 7.2*   MCV  --  96  --   --  95   PLT  --  195  --   --  191    < > = values in this interval not displayed.     Most Recent 3 BMP's:  Recent Labs   Lab Test 10/08/23  0913 10/07/23  0752 10/06/23  2035 10/06/23  1400    139  --  140   POTASSIUM 5.1 5.1 5.3 5.4*   CHLORIDE 110* 113*  --  112*   CO2 24 22  --  20*   BUN 27.1* 29.9*  --  38.3*   CR 1.75* 1.61*  --  1.49*   ANIONGAP 6* 4*  --  8   SAVI 8.2* 7.8*  --  8.0*   GLC 97 76  --  85     Most Recent 2 LFT's:  Recent Labs   Lab Test 10/08/23  0913   AST 19   ALT 16   ALKPHOS 65   BILITOTAL 0.6     Most Recent 3 INR's:No lab results found.      Discharge Orders        Reason for your hospital stay    Black stools     Follow-up and recommended labs and tests     Follow up with primary care provider, Katie Cox, within 7  days for hospital follow- up.  The following labs/tests are recommended: hgb.     Activity    Your activity upon discharge: activity as tolerated     Diet    Follow this diet upon discharge: Orders Placed This Encounter      Advance Diet as Tolerated: Regular Diet Adult       Examination   BP (!) 152/68   Pulse 51   Temp 98.4  F (36.9  C) (Oral)   Resp 20   Ht 1.524 m (5')   Wt 65.3 kg (143 lb 15.4 oz)   SpO2 98%   BMI 28.12 kg/m        General: Not in obvious distress.  HEENT: NC, AT   Chest: Clear to auscultation bilaterally  Heart: S1S2 normal, regular. Loud systolic and diastolic murmur noted.   Abdomen: Soft. NT, ND. Bowel sounds- active.  Extremities: No legs swelling  Neuro: Awake, grossly non-focal      Please see EMR for more detailed significant labs, imaging, consultant notes etc.    I, MEERA Jerome, personally saw the patient today and spent greater than 30 minutes discharging this patient.    MEERA Jerome  [facility]    CC:Katie Cox

## 2023-10-08 NOTE — PLAN OF CARE
Problem: Plan of Care - These are the overarching goals to be used throughout the patient stay.    Goal: Optimal Comfort and Wellbeing  Outcome: Progressing     Problem: Hypertension Acute  Goal: Blood Pressure Within Desired Range  Outcome: Progressing  Intervention: Normalize Blood Pressure  Recent Flowsheet Documentation  Taken 10/7/2023 2102 by Reinaldo Ibarra, NIGEL  Medication Review/Management: medications reviewed     Goal Outcome Evaluation: Pt denies any pain. BP within parameters. No prn needed. On tele, sinus bridget. Family bedside.

## 2023-10-08 NOTE — PLAN OF CARE
Shift from 0700 to 1930-      Problem: Anemia  Goal: Anemia Symptom Improvement  Outcome: Progressing  Intervention: Monitor and Manage Anemia  Recent Flowsheet Documentation  Taken 10/7/2023 1540 by Sury Michael, RN  Safety Promotion/Fall Prevention:   assistive device/personal items within reach   clutter free environment maintained   nonskid shoes/slippers when out of bed   patient and family education  Taken 10/7/2023 1015 by Sury Michael, RN  Safety Promotion/Fall Prevention:   assistive device/personal items within reach   clutter free environment maintained   nonskid shoes/slippers when out of bed   patient and family education  Taken 10/7/2023 0800 by Sury Michael, RN  Safety Promotion/Fall Prevention:   assistive device/personal items within reach   clutter free environment maintained   nonskid shoes/slippers when out of bed   patient and family education       Goal Outcome Evaluation:    NPO overnight.Pt went to EGD in am; See results. Pt on protonix.   Patient's Hgb was 7.1 today; PRBC ordered and given; Pt tolerated;   Up with one assist. Eating well. Diet advanced after EGD slowly; Now on regular diet.  HTN today; Started on new BP meds; See MAR. Given one dose of hydralazine prn. BP improved. MD aware of HTN.

## 2023-10-08 NOTE — PLAN OF CARE
Problem: Plan of Care - These are the overarching goals to be used throughout the patient stay.    Goal: Optimal Comfort and Wellbeing  10/8/2023 0648 by Reinaldo Ibarra RN  Outcome: Progressing  10/7/2023 2251 by Reinaldo Ibarra RN  Outcome: Progressing     Problem: Hypertension Acute  Goal: Blood Pressure Within Desired Range  10/8/2023 0648 by Reinaldo Ibarra RN  Outcome: Progressing  10/7/2023 2251 by Reinaldo Ibarra RN  Outcome: Progressing  Intervention: Normalize Blood Pressure  Recent Flowsheet Documentation  Taken 10/8/2023 0331 by Reinaldo Ibarra RN  Medication Review/Management: medications reviewed  Taken 10/7/2023 2102 by Reinaldo Ibarra RN  Medication Review/Management: medications reviewed     Goal Outcome Evaluation: Pt slept through the night. Denies any pain. BP around 4am was 181/78. Recheck BP before giving prn hydralazine, BP was 165/74. No prn hydralazine given. On tele, sinus bridget. Family bedside.

## 2023-10-08 NOTE — PROGRESS NOTES
Care Management Discharge Note    Discharge Date: 10/08/2023       Discharge Disposition: Home    Discharge Services: None    Discharge DME: None    Discharge Transportation: family or friend will provide    Private pay costs discussed: Not applicable    Does the patient's insurance plan have a 3 day qualifying hospital stay waiver?  No    PAS Confirmation Code:  NA  Patient/family educated on Medicare website which has current facility and service quality ratings:  NA    Education Provided on the Discharge Plan:  Per team  Persons Notified of Discharge Plans: Patient per team  Patient/Family in Agreement with the Plan: yes    Handoff Referral Completed: Yes    Additional Information:  Patient discharge home. No CM discharge planning needs. Family will transport.    Yessenia Stroud RN

## 2023-10-08 NOTE — PLAN OF CARE
"Shift fro 0700 to 1930-    Problem: Hypertension Acute  Goal: Blood Pressure Within Desired Range  Outcome: Met  Intervention: Normalize Blood Pressure  Recent Flowsheet Documentation  Taken 10/8/2023 0742 by Sury Michael, RN  Medication Review/Management: medications reviewed     Problem: Gastrointestinal Bleeding  Goal: Optimal Coping with Acute Illness  Outcome: Met  Goal: Hemostasis  Outcome: Met       Goal Outcome Evaluation:  HGB improved to 9.3; No signs of active bleeding.  Pt feels \"much better today\". Eager to go home.   Seen by MD.   Reviewed AVS and to follow up with primary, cardiology, and GI.   BP elevated; given am meds and hydralazine. Recheck improved. MD aware.                      "

## 2023-10-08 NOTE — PROGRESS NOTES
"Care Management Follow Up    Length of Stay (days): 2    Expected Discharge Date: 10/08/2023     Concerns to be Addressed: Care progression - discharge planning     Patient plan of care discussed at interdisciplinary rounds: Yes    Anticipated Discharge Disposition:  Home     Anticipated Discharge Services:  None  Anticipated Discharge DME:  JUAN MIGUEL    Patient/family educated on Medicare website which has current facility and service quality ratings:  NA  Education Provided on the Discharge Plan:  Per team  Patient/Family in Agreement with the Plan:  NA    Referrals Placed by CM/SW:  JUAN MIGUEL  Private pay costs discussed: Not applicable    Additional Information:  Chart reviewed.     Social Hx: \"Lives w/son Leepor and family. Independent w/all I/ADLs, drives. Family to transport.\"    RNCM to follow for medical progression, recommendations, and final discharge plan.     Yessenia Stroud RN     "

## 2023-10-08 NOTE — DISCHARGE INSTRUCTIONS
Follow up with [facility] in 2 months. They will call about appt.    Follow up with cardiology at primary MD visit.

## 2023-10-09 LAB
ATRIAL RATE - MUSE: 52 BPM
DIASTOLIC BLOOD PRESSURE - MUSE: NORMAL MMHG
INTERPRETATION ECG - MUSE: NORMAL
P AXIS - MUSE: 55 DEGREES
PR INTERVAL - MUSE: 180 MS
QRS DURATION - MUSE: 110 MS
QT - MUSE: 478 MS
QTC - MUSE: 444 MS
R AXIS - MUSE: -31 DEGREES
SYSTOLIC BLOOD PRESSURE - MUSE: NORMAL MMHG
T AXIS - MUSE: -31 DEGREES
VENTRICULAR RATE- MUSE: 52 BPM

## 2023-10-10 LAB
PATH REPORT.COMMENTS IMP SPEC: NORMAL
PATH REPORT.FINAL DX SPEC: NORMAL
PATH REPORT.GROSS SPEC: NORMAL
PATH REPORT.MICROSCOPIC SPEC OTHER STN: NORMAL
PATH REPORT.RELEVANT HX SPEC: NORMAL
PHOTO IMAGE: NORMAL

## 2023-12-24 ENCOUNTER — HEALTH MAINTENANCE LETTER (OUTPATIENT)
Age: 78
End: 2023-12-24

## 2024-03-17 ENCOUNTER — APPOINTMENT (OUTPATIENT)
Dept: CT IMAGING | Facility: HOSPITAL | Age: 79
DRG: 378 | End: 2024-03-17
Attending: EMERGENCY MEDICINE
Payer: COMMERCIAL

## 2024-03-17 ENCOUNTER — HOSPITAL ENCOUNTER (INPATIENT)
Facility: HOSPITAL | Age: 79
LOS: 5 days | Discharge: HOME | DRG: 378 | End: 2024-03-23
Attending: EMERGENCY MEDICINE | Admitting: STUDENT IN AN ORGANIZED HEALTH CARE EDUCATION/TRAINING PROGRAM
Payer: COMMERCIAL

## 2024-03-17 DIAGNOSIS — K62.5 BRBPR (BRIGHT RED BLOOD PER RECTUM): ICD-10-CM

## 2024-03-17 DIAGNOSIS — B96.81: ICD-10-CM

## 2024-03-17 DIAGNOSIS — I10 ESSENTIAL HYPERTENSION, BENIGN: Primary | ICD-10-CM

## 2024-03-17 DIAGNOSIS — K92.2 GASTROINTESTINAL HEMORRHAGE, UNSPECIFIED GASTROINTESTINAL HEMORRHAGE TYPE: ICD-10-CM

## 2024-03-17 DIAGNOSIS — D62 ANEMIA DUE TO BLOOD LOSS, ACUTE: ICD-10-CM

## 2024-03-17 DIAGNOSIS — E87.20 METABOLIC ACIDOSIS: ICD-10-CM

## 2024-03-17 DIAGNOSIS — K25.0: ICD-10-CM

## 2024-03-17 LAB
ABO/RH(D): NORMAL
ALBUMIN SERPL BCG-MCNC: 3 G/DL (ref 3.5–5.2)
ALP SERPL-CCNC: 45 U/L (ref 40–150)
ALT SERPL W P-5'-P-CCNC: 17 U/L (ref 0–70)
ANION GAP SERPL CALCULATED.3IONS-SCNC: 9 MMOL/L (ref 7–15)
ANTIBODY SCREEN: NEGATIVE
APTT PPP: 23 SECONDS (ref 22–38)
AST SERPL W P-5'-P-CCNC: 25 U/L (ref 0–45)
BASOPHILS # BLD AUTO: 0.1 10E3/UL (ref 0–0.2)
BASOPHILS NFR BLD AUTO: 1 %
BILIRUB DIRECT SERPL-MCNC: <0.2 MG/DL (ref 0–0.3)
BILIRUB SERPL-MCNC: 0.4 MG/DL
BLD PROD TYP BPU: NORMAL
BLD PROD TYP BPU: NORMAL
BLOOD COMPONENT TYPE: NORMAL
BLOOD COMPONENT TYPE: NORMAL
BUN SERPL-MCNC: 57.6 MG/DL (ref 8–23)
CALCIUM SERPL-MCNC: 8 MG/DL (ref 8.8–10.2)
CHLORIDE SERPL-SCNC: 111 MMOL/L (ref 98–107)
CODING SYSTEM: NORMAL
CODING SYSTEM: NORMAL
CREAT SERPL-MCNC: 1.93 MG/DL (ref 0.67–1.17)
CROSSMATCH: NORMAL
CROSSMATCH: NORMAL
DEPRECATED HCO3 PLAS-SCNC: 20 MMOL/L (ref 22–29)
EGFRCR SERPLBLD CKD-EPI 2021: 35 ML/MIN/1.73M2
EOSINOPHIL # BLD AUTO: 1.4 10E3/UL (ref 0–0.7)
EOSINOPHIL NFR BLD AUTO: 12 %
ERYTHROCYTE [DISTWIDTH] IN BLOOD BY AUTOMATED COUNT: 14.2 % (ref 10–15)
GLUCOSE SERPL-MCNC: 165 MG/DL (ref 70–99)
HCT VFR BLD AUTO: 25.3 % (ref 40–53)
HGB BLD-MCNC: 8.1 G/DL (ref 13.3–17.7)
IMM GRANULOCYTES # BLD: 0 10E3/UL
IMM GRANULOCYTES NFR BLD: 0 %
INR PPP: 1.06 (ref 0.85–1.15)
ISSUE DATE AND TIME: NORMAL
ISSUE DATE AND TIME: NORMAL
LACTATE SERPL-SCNC: 2.4 MMOL/L (ref 0.7–2)
LYMPHOCYTES # BLD AUTO: 3.3 10E3/UL (ref 0.8–5.3)
LYMPHOCYTES NFR BLD AUTO: 29 %
MCH RBC QN AUTO: 30.5 PG (ref 26.5–33)
MCHC RBC AUTO-ENTMCNC: 32 G/DL (ref 31.5–36.5)
MCV RBC AUTO: 95 FL (ref 78–100)
MONOCYTES # BLD AUTO: 1.2 10E3/UL (ref 0–1.3)
MONOCYTES NFR BLD AUTO: 10 %
NEUTROPHILS # BLD AUTO: 5.5 10E3/UL (ref 1.6–8.3)
NEUTROPHILS NFR BLD AUTO: 48 %
NRBC # BLD AUTO: 0 10E3/UL
NRBC BLD AUTO-RTO: 0 /100
PLATELET # BLD AUTO: 172 10E3/UL (ref 150–450)
POTASSIUM SERPL-SCNC: 5.2 MMOL/L (ref 3.4–5.3)
PROT SERPL-MCNC: 4.9 G/DL (ref 6.4–8.3)
RBC # BLD AUTO: 2.66 10E6/UL (ref 4.4–5.9)
SODIUM SERPL-SCNC: 140 MMOL/L (ref 135–145)
SPECIMEN EXPIRATION DATE: NORMAL
UNIT ABO/RH: NORMAL
UNIT ABO/RH: NORMAL
UNIT NUMBER: NORMAL
UNIT NUMBER: NORMAL
UNIT STATUS: NORMAL
UNIT STATUS: NORMAL
UNIT TYPE ISBT: 5100
UNIT TYPE ISBT: 5100
WBC # BLD AUTO: 11.5 10E3/UL (ref 4–11)

## 2024-03-17 PROCEDURE — 86923 COMPATIBILITY TEST ELECTRIC: CPT | Performed by: EMERGENCY MEDICINE

## 2024-03-17 PROCEDURE — 86334 IMMUNOFIX E-PHORESIS SERUM: CPT | Performed by: PATHOLOGY

## 2024-03-17 PROCEDURE — 85045 AUTOMATED RETICULOCYTE COUNT: CPT | Performed by: STUDENT IN AN ORGANIZED HEALTH CARE EDUCATION/TRAINING PROGRAM

## 2024-03-17 PROCEDURE — 86900 BLOOD TYPING SEROLOGIC ABO: CPT | Performed by: EMERGENCY MEDICINE

## 2024-03-17 PROCEDURE — 83036 HEMOGLOBIN GLYCOSYLATED A1C: CPT | Performed by: STUDENT IN AN ORGANIZED HEALTH CARE EDUCATION/TRAINING PROGRAM

## 2024-03-17 PROCEDURE — 74174 CTA ABD&PLVS W/CONTRAST: CPT

## 2024-03-17 PROCEDURE — 83550 IRON BINDING TEST: CPT | Performed by: STUDENT IN AN ORGANIZED HEALTH CARE EDUCATION/TRAINING PROGRAM

## 2024-03-17 PROCEDURE — 85730 THROMBOPLASTIN TIME PARTIAL: CPT | Performed by: EMERGENCY MEDICINE

## 2024-03-17 PROCEDURE — 93005 ELECTROCARDIOGRAM TRACING: CPT | Performed by: EMERGENCY MEDICINE

## 2024-03-17 PROCEDURE — 83735 ASSAY OF MAGNESIUM: CPT | Performed by: STUDENT IN AN ORGANIZED HEALTH CARE EDUCATION/TRAINING PROGRAM

## 2024-03-17 PROCEDURE — 36430 TRANSFUSION BLD/BLD COMPNT: CPT

## 2024-03-17 PROCEDURE — 85025 COMPLETE CBC W/AUTO DIFF WBC: CPT | Performed by: EMERGENCY MEDICINE

## 2024-03-17 PROCEDURE — 86923 COMPATIBILITY TEST ELECTRIC: CPT | Performed by: INTERNAL MEDICINE

## 2024-03-17 PROCEDURE — 250N000011 HC RX IP 250 OP 636: Performed by: EMERGENCY MEDICINE

## 2024-03-17 PROCEDURE — 82248 BILIRUBIN DIRECT: CPT | Performed by: EMERGENCY MEDICINE

## 2024-03-17 PROCEDURE — 82607 VITAMIN B-12: CPT | Performed by: STUDENT IN AN ORGANIZED HEALTH CARE EDUCATION/TRAINING PROGRAM

## 2024-03-17 PROCEDURE — 96365 THER/PROPH/DIAG IV INF INIT: CPT | Mod: 59

## 2024-03-17 PROCEDURE — 84165 PROTEIN E-PHORESIS SERUM: CPT | Mod: TC | Performed by: PATHOLOGY

## 2024-03-17 PROCEDURE — 82728 ASSAY OF FERRITIN: CPT | Performed by: STUDENT IN AN ORGANIZED HEALTH CARE EDUCATION/TRAINING PROGRAM

## 2024-03-17 PROCEDURE — 83605 ASSAY OF LACTIC ACID: CPT | Performed by: EMERGENCY MEDICINE

## 2024-03-17 PROCEDURE — 96366 THER/PROPH/DIAG IV INF ADDON: CPT

## 2024-03-17 PROCEDURE — 99291 CRITICAL CARE FIRST HOUR: CPT | Mod: 25

## 2024-03-17 PROCEDURE — C9113 INJ PANTOPRAZOLE SODIUM, VIA: HCPCS | Performed by: EMERGENCY MEDICINE

## 2024-03-17 PROCEDURE — 96374 THER/PROPH/DIAG INJ IV PUSH: CPT | Mod: 59

## 2024-03-17 PROCEDURE — 84100 ASSAY OF PHOSPHORUS: CPT | Performed by: STUDENT IN AN ORGANIZED HEALTH CARE EDUCATION/TRAINING PROGRAM

## 2024-03-17 PROCEDURE — 36415 COLL VENOUS BLD VENIPUNCTURE: CPT | Performed by: EMERGENCY MEDICINE

## 2024-03-17 PROCEDURE — 84155 ASSAY OF PROTEIN SERUM: CPT | Performed by: PATHOLOGY

## 2024-03-17 PROCEDURE — 85610 PROTHROMBIN TIME: CPT | Performed by: EMERGENCY MEDICINE

## 2024-03-17 PROCEDURE — 96361 HYDRATE IV INFUSION ADD-ON: CPT

## 2024-03-17 PROCEDURE — 80053 COMPREHEN METABOLIC PANEL: CPT | Performed by: EMERGENCY MEDICINE

## 2024-03-17 RX ORDER — HYDRALAZINE HYDROCHLORIDE 100 MG/1
100 TABLET, FILM COATED ORAL 3 TIMES DAILY
Status: ON HOLD | COMMUNITY
End: 2024-03-23

## 2024-03-17 RX ORDER — AMLODIPINE AND VALSARTAN 10; 160 MG/1; MG/1
1 TABLET ORAL DAILY
Status: ON HOLD | COMMUNITY
End: 2024-03-23

## 2024-03-17 RX ORDER — TIZANIDINE 2 MG/1
2-4 TABLET ORAL EVERY 6 HOURS PRN
COMMUNITY
Start: 2023-10-13

## 2024-03-17 RX ORDER — ASPIRIN 81 MG/1
81 TABLET, COATED ORAL DAILY
Status: ON HOLD | COMMUNITY
End: 2024-03-23

## 2024-03-17 RX ORDER — IOPAMIDOL 755 MG/ML
80 INJECTION, SOLUTION INTRAVASCULAR ONCE
Status: COMPLETED | OUTPATIENT
Start: 2024-03-18 | End: 2024-03-17

## 2024-03-17 RX ORDER — ISOSORBIDE MONONITRATE 30 MG/1
30 TABLET, EXTENDED RELEASE ORAL DAILY
COMMUNITY

## 2024-03-17 RX ORDER — ROSUVASTATIN CALCIUM 40 MG/1
40 TABLET, COATED ORAL AT BEDTIME
COMMUNITY
Start: 2024-03-04

## 2024-03-17 RX ADMIN — IOPAMIDOL 75 ML: 755 INJECTION, SOLUTION INTRAVENOUS at 23:50

## 2024-03-17 RX ADMIN — PANTOPRAZOLE SODIUM 80 MG: 40 INJECTION, POWDER, FOR SOLUTION INTRAVENOUS at 23:06

## 2024-03-17 ASSESSMENT — ACTIVITIES OF DAILY LIVING (ADL): ADLS_ACUITY_SCORE: 36

## 2024-03-18 ENCOUNTER — ANESTHESIA EVENT (OUTPATIENT)
Dept: SURGERY | Facility: HOSPITAL | Age: 79
DRG: 378 | End: 2024-03-18
Payer: COMMERCIAL

## 2024-03-18 ENCOUNTER — APPOINTMENT (OUTPATIENT)
Dept: OCCUPATIONAL THERAPY | Facility: HOSPITAL | Age: 79
DRG: 378 | End: 2024-03-18
Attending: STUDENT IN AN ORGANIZED HEALTH CARE EDUCATION/TRAINING PROGRAM
Payer: COMMERCIAL

## 2024-03-18 ENCOUNTER — APPOINTMENT (OUTPATIENT)
Dept: PHYSICAL THERAPY | Facility: HOSPITAL | Age: 79
DRG: 378 | End: 2024-03-18
Attending: STUDENT IN AN ORGANIZED HEALTH CARE EDUCATION/TRAINING PROGRAM
Payer: COMMERCIAL

## 2024-03-18 ENCOUNTER — ANESTHESIA (OUTPATIENT)
Dept: SURGERY | Facility: HOSPITAL | Age: 79
DRG: 378 | End: 2024-03-18
Payer: COMMERCIAL

## 2024-03-18 PROBLEM — K62.5 BRBPR (BRIGHT RED BLOOD PER RECTUM): Status: ACTIVE | Noted: 2024-03-18

## 2024-03-18 PROBLEM — K92.2 GASTROINTESTINAL HEMORRHAGE, UNSPECIFIED GASTROINTESTINAL HEMORRHAGE TYPE: Status: ACTIVE | Noted: 2023-10-06

## 2024-03-18 LAB
ALBUMIN SERPL BCG-MCNC: 3.4 G/DL (ref 3.5–5.2)
ALBUMIN UR-MCNC: NEGATIVE MG/DL
ALP SERPL-CCNC: 39 U/L (ref 40–150)
ALT SERPL W P-5'-P-CCNC: 12 U/L (ref 0–70)
ANION GAP SERPL CALCULATED.3IONS-SCNC: 7 MMOL/L (ref 7–15)
APPEARANCE UR: CLEAR
AST SERPL W P-5'-P-CCNC: 18 U/L (ref 0–45)
ATRIAL RATE - MUSE: 75 BPM
BACTERIA #/AREA URNS HPF: ABNORMAL /HPF
BILIRUB SERPL-MCNC: 0.8 MG/DL
BILIRUB UR QL STRIP: NEGATIVE
BUN SERPL-MCNC: 51 MG/DL (ref 8–23)
CALCIUM SERPL-MCNC: 7.8 MG/DL (ref 8.8–10.2)
CHLORIDE SERPL-SCNC: 112 MMOL/L (ref 98–107)
COLOR UR AUTO: COLORLESS
CREAT SERPL-MCNC: 1.75 MG/DL (ref 0.67–1.17)
DEPRECATED HCO3 PLAS-SCNC: 19 MMOL/L (ref 22–29)
DIASTOLIC BLOOD PRESSURE - MUSE: 65 MMHG
EGFRCR SERPLBLD CKD-EPI 2021: 39 ML/MIN/1.73M2
ERYTHROCYTE [DISTWIDTH] IN BLOOD BY AUTOMATED COUNT: 15.2 % (ref 10–15)
FERRITIN SERPL-MCNC: 127 NG/ML (ref 31–409)
GLUCOSE BLDC GLUCOMTR-MCNC: 156 MG/DL (ref 70–99)
GLUCOSE BLDC GLUCOMTR-MCNC: 87 MG/DL (ref 70–99)
GLUCOSE BLDC GLUCOMTR-MCNC: 96 MG/DL (ref 70–99)
GLUCOSE SERPL-MCNC: 91 MG/DL (ref 70–99)
GLUCOSE UR STRIP-MCNC: NEGATIVE MG/DL
HBA1C MFR BLD: 5.6 %
HCT VFR BLD AUTO: 24.1 % (ref 40–53)
HGB BLD-MCNC: 7.8 G/DL (ref 13.3–17.7)
HGB BLD-MCNC: 7.8 G/DL (ref 13.3–17.7)
HGB BLD-MCNC: 8 G/DL (ref 13.3–17.7)
HGB UR QL STRIP: NEGATIVE
HOLD SPECIMEN: NORMAL
HOLD SPECIMEN: NORMAL
HYALINE CASTS: 1 /LPF
INTERPRETATION ECG - MUSE: NORMAL
IRON BINDING CAPACITY (ROCHE): 208 UG/DL (ref 240–430)
IRON SATN MFR SERPL: 21 % (ref 15–46)
IRON SERPL-MCNC: 44 UG/DL (ref 61–157)
KETONES UR STRIP-MCNC: NEGATIVE MG/DL
LACTATE SERPL-SCNC: 1.8 MMOL/L (ref 0.7–2)
LEUKOCYTE ESTERASE UR QL STRIP: NEGATIVE
MAGNESIUM SERPL-MCNC: 2 MG/DL (ref 1.7–2.3)
MCH RBC QN AUTO: 29.3 PG (ref 26.5–33)
MCHC RBC AUTO-ENTMCNC: 32.4 G/DL (ref 31.5–36.5)
MCV RBC AUTO: 91 FL (ref 78–100)
NITRATE UR QL: NEGATIVE
P AXIS - MUSE: 53 DEGREES
PH UR STRIP: 5.5 [PH] (ref 5–7)
PHOSPHATE SERPL-MCNC: 3.8 MG/DL (ref 2.5–4.5)
PLATELET # BLD AUTO: 118 10E3/UL (ref 150–450)
POTASSIUM SERPL-SCNC: 5.3 MMOL/L (ref 3.4–5.3)
POTASSIUM SERPL-SCNC: 6.1 MMOL/L (ref 3.4–5.3)
PR INTERVAL - MUSE: 174 MS
PROT SERPL-MCNC: 4.9 G/DL (ref 6.4–8.3)
QRS DURATION - MUSE: 106 MS
QT - MUSE: 392 MS
QTC - MUSE: 437 MS
R AXIS - MUSE: -37 DEGREES
RBC # BLD AUTO: 2.66 10E6/UL (ref 4.4–5.9)
RBC URINE: 0 /HPF
RETICS # AUTO: 0.05 10E6/UL (ref 0.03–0.1)
RETICS/RBC NFR AUTO: 1.9 % (ref 0.5–2)
SODIUM SERPL-SCNC: 138 MMOL/L (ref 135–145)
SP GR UR STRIP: 1.03 (ref 1–1.03)
SYSTOLIC BLOOD PRESSURE - MUSE: 139 MMHG
T AXIS - MUSE: 28 DEGREES
UPPER GI ENDOSCOPY: NORMAL
UROBILINOGEN UR STRIP-MCNC: <2 MG/DL
VENTRICULAR RATE- MUSE: 75 BPM
VIT B12 SERPL-MCNC: 212 PG/ML (ref 232–1245)
WBC # BLD AUTO: 7.7 10E3/UL (ref 4–11)
WBC URINE: <1 /HPF

## 2024-03-18 PROCEDURE — 370N000017 HC ANESTHESIA TECHNICAL FEE, PER MIN: Performed by: INTERNAL MEDICINE

## 2024-03-18 PROCEDURE — 250N000009 HC RX 250: Performed by: INTERNAL MEDICINE

## 2024-03-18 PROCEDURE — P9016 RBC LEUKOCYTES REDUCED: HCPCS | Performed by: EMERGENCY MEDICINE

## 2024-03-18 PROCEDURE — C9113 INJ PANTOPRAZOLE SODIUM, VIA: HCPCS | Performed by: INTERNAL MEDICINE

## 2024-03-18 PROCEDURE — 258N000003 HC RX IP 258 OP 636: Performed by: EMERGENCY MEDICINE

## 2024-03-18 PROCEDURE — 250N000011 HC RX IP 250 OP 636: Performed by: NURSE ANESTHETIST, CERTIFIED REGISTERED

## 2024-03-18 PROCEDURE — 0W3P8ZZ CONTROL BLEEDING IN GASTROINTESTINAL TRACT, VIA NATURAL OR ARTIFICIAL OPENING ENDOSCOPIC: ICD-10-PCS | Performed by: INTERNAL MEDICINE

## 2024-03-18 PROCEDURE — 81001 URINALYSIS AUTO W/SCOPE: CPT | Performed by: STUDENT IN AN ORGANIZED HEALTH CARE EDUCATION/TRAINING PROGRAM

## 2024-03-18 PROCEDURE — 250N000011 HC RX IP 250 OP 636: Performed by: INTERNAL MEDICINE

## 2024-03-18 PROCEDURE — 84156 ASSAY OF PROTEIN URINE: CPT | Performed by: PHYSICIAN ASSISTANT

## 2024-03-18 PROCEDURE — 82962 GLUCOSE BLOOD TEST: CPT

## 2024-03-18 PROCEDURE — 258N000003 HC RX IP 258 OP 636: Performed by: NURSE ANESTHETIST, CERTIFIED REGISTERED

## 2024-03-18 PROCEDURE — 85018 HEMOGLOBIN: CPT | Performed by: INTERNAL MEDICINE

## 2024-03-18 PROCEDURE — 0DB68ZX EXCISION OF STOMACH, VIA NATURAL OR ARTIFICIAL OPENING ENDOSCOPIC, DIAGNOSTIC: ICD-10-PCS | Performed by: INTERNAL MEDICINE

## 2024-03-18 PROCEDURE — 84166 PROTEIN E-PHORESIS/URINE/CSF: CPT | Performed by: PATHOLOGY

## 2024-03-18 PROCEDURE — 36415 COLL VENOUS BLD VENIPUNCTURE: CPT | Performed by: STUDENT IN AN ORGANIZED HEALTH CARE EDUCATION/TRAINING PROGRAM

## 2024-03-18 PROCEDURE — 99223 1ST HOSP IP/OBS HIGH 75: CPT | Performed by: STUDENT IN AN ORGANIZED HEALTH CARE EDUCATION/TRAINING PROGRAM

## 2024-03-18 PROCEDURE — 80053 COMPREHEN METABOLIC PANEL: CPT | Performed by: STUDENT IN AN ORGANIZED HEALTH CARE EDUCATION/TRAINING PROGRAM

## 2024-03-18 PROCEDURE — P9047 ALBUMIN (HUMAN), 25%, 50ML: HCPCS | Performed by: STUDENT IN AN ORGANIZED HEALTH CARE EDUCATION/TRAINING PROGRAM

## 2024-03-18 PROCEDURE — 36415 COLL VENOUS BLD VENIPUNCTURE: CPT | Performed by: EMERGENCY MEDICINE

## 2024-03-18 PROCEDURE — 250N000013 HC RX MED GY IP 250 OP 250 PS 637: Performed by: STUDENT IN AN ORGANIZED HEALTH CARE EDUCATION/TRAINING PROGRAM

## 2024-03-18 PROCEDURE — 86335 IMMUNFIX E-PHORSIS/URINE/CSF: CPT | Performed by: PATHOLOGY

## 2024-03-18 PROCEDURE — 250N000013 HC RX MED GY IP 250 OP 250 PS 637: Performed by: INTERNAL MEDICINE

## 2024-03-18 PROCEDURE — 360N000075 HC SURGERY LEVEL 2, PER MIN: Performed by: INTERNAL MEDICINE

## 2024-03-18 PROCEDURE — 97116 GAIT TRAINING THERAPY: CPT | Mod: GP

## 2024-03-18 PROCEDURE — 120N000001 HC R&B MED SURG/OB

## 2024-03-18 PROCEDURE — 97165 OT EVAL LOW COMPLEX 30 MIN: CPT | Mod: GO

## 2024-03-18 PROCEDURE — 258N000003 HC RX IP 258 OP 636: Performed by: STUDENT IN AN ORGANIZED HEALTH CARE EDUCATION/TRAINING PROGRAM

## 2024-03-18 PROCEDURE — 84132 ASSAY OF SERUM POTASSIUM: CPT | Performed by: INTERNAL MEDICINE

## 2024-03-18 PROCEDURE — 272N000001 HC OR GENERAL SUPPLY STERILE: Performed by: INTERNAL MEDICINE

## 2024-03-18 PROCEDURE — 83605 ASSAY OF LACTIC ACID: CPT | Performed by: EMERGENCY MEDICINE

## 2024-03-18 PROCEDURE — 97161 PT EVAL LOW COMPLEX 20 MIN: CPT | Mod: GP

## 2024-03-18 PROCEDURE — 97530 THERAPEUTIC ACTIVITIES: CPT | Mod: GO

## 2024-03-18 PROCEDURE — 250N000011 HC RX IP 250 OP 636: Performed by: STUDENT IN AN ORGANIZED HEALTH CARE EDUCATION/TRAINING PROGRAM

## 2024-03-18 PROCEDURE — 250N000025 HC SEVOFLURANE, PER MIN: Performed by: INTERNAL MEDICINE

## 2024-03-18 PROCEDURE — 36415 COLL VENOUS BLD VENIPUNCTURE: CPT | Performed by: INTERNAL MEDICINE

## 2024-03-18 PROCEDURE — 85027 COMPLETE CBC AUTOMATED: CPT | Performed by: STUDENT IN AN ORGANIZED HEALTH CARE EDUCATION/TRAINING PROGRAM

## 2024-03-18 PROCEDURE — 258N000003 HC RX IP 258 OP 636: Performed by: INTERNAL MEDICINE

## 2024-03-18 PROCEDURE — 710N000009 HC RECOVERY PHASE 1, LEVEL 1, PER MIN: Performed by: INTERNAL MEDICINE

## 2024-03-18 PROCEDURE — 999N000141 HC STATISTIC PRE-PROCEDURE NURSING ASSESSMENT: Performed by: INTERNAL MEDICINE

## 2024-03-18 PROCEDURE — 88305 TISSUE EXAM BY PATHOLOGIST: CPT | Mod: TC | Performed by: INTERNAL MEDICINE

## 2024-03-18 RX ORDER — AMOXICILLIN 250 MG
2 CAPSULE ORAL 2 TIMES DAILY PRN
Status: DISCONTINUED | OUTPATIENT
Start: 2024-03-18 | End: 2024-03-23 | Stop reason: HOSPADM

## 2024-03-18 RX ORDER — NALOXONE HYDROCHLORIDE 0.4 MG/ML
0.4 INJECTION, SOLUTION INTRAMUSCULAR; INTRAVENOUS; SUBCUTANEOUS
Status: DISCONTINUED | OUTPATIENT
Start: 2024-03-18 | End: 2024-03-23 | Stop reason: HOSPADM

## 2024-03-18 RX ORDER — LIDOCAINE 40 MG/G
CREAM TOPICAL
Status: DISCONTINUED | OUTPATIENT
Start: 2024-03-18 | End: 2024-03-18 | Stop reason: HOSPADM

## 2024-03-18 RX ORDER — ROSUVASTATIN CALCIUM 40 MG/1
40 TABLET, COATED ORAL AT BEDTIME
Status: DISCONTINUED | OUTPATIENT
Start: 2024-03-18 | End: 2024-03-23 | Stop reason: HOSPADM

## 2024-03-18 RX ORDER — NALOXONE HYDROCHLORIDE 0.4 MG/ML
0.2 INJECTION, SOLUTION INTRAMUSCULAR; INTRAVENOUS; SUBCUTANEOUS
Status: DISCONTINUED | OUTPATIENT
Start: 2024-03-18 | End: 2024-03-23 | Stop reason: HOSPADM

## 2024-03-18 RX ORDER — ALBUMIN (HUMAN) 12.5 G/50ML
50 SOLUTION INTRAVENOUS ONCE
Status: COMPLETED | OUTPATIENT
Start: 2024-03-18 | End: 2024-03-18

## 2024-03-18 RX ORDER — ACETAMINOPHEN 500 MG
500 TABLET ORAL EVERY 6 HOURS PRN
Status: DISCONTINUED | OUTPATIENT
Start: 2024-03-18 | End: 2024-03-23 | Stop reason: HOSPADM

## 2024-03-18 RX ORDER — NALOXONE HYDROCHLORIDE 0.4 MG/ML
0.4 INJECTION, SOLUTION INTRAMUSCULAR; INTRAVENOUS; SUBCUTANEOUS
Status: DISCONTINUED | OUTPATIENT
Start: 2024-03-18 | End: 2024-03-18 | Stop reason: HOSPADM

## 2024-03-18 RX ORDER — DOXYCYCLINE 100 MG/1
100 CAPSULE ORAL EVERY 12 HOURS SCHEDULED
Status: DISCONTINUED | OUTPATIENT
Start: 2024-03-18 | End: 2024-03-23 | Stop reason: HOSPADM

## 2024-03-18 RX ORDER — AMOXICILLIN 250 MG
1 CAPSULE ORAL 2 TIMES DAILY PRN
Status: DISCONTINUED | OUTPATIENT
Start: 2024-03-18 | End: 2024-03-23 | Stop reason: HOSPADM

## 2024-03-18 RX ORDER — NALOXONE HYDROCHLORIDE 0.4 MG/ML
0.2 INJECTION, SOLUTION INTRAMUSCULAR; INTRAVENOUS; SUBCUTANEOUS
Status: DISCONTINUED | OUTPATIENT
Start: 2024-03-18 | End: 2024-03-18 | Stop reason: HOSPADM

## 2024-03-18 RX ORDER — FENTANYL CITRATE 50 UG/ML
25 INJECTION, SOLUTION INTRAMUSCULAR; INTRAVENOUS EVERY 5 MIN PRN
Status: DISCONTINUED | OUTPATIENT
Start: 2024-03-18 | End: 2024-03-18 | Stop reason: HOSPADM

## 2024-03-18 RX ORDER — DEXAMETHASONE SODIUM PHOSPHATE 10 MG/ML
INJECTION, SOLUTION INTRAMUSCULAR; INTRAVENOUS PRN
Status: DISCONTINUED | OUTPATIENT
Start: 2024-03-18 | End: 2024-03-18

## 2024-03-18 RX ORDER — SODIUM CHLORIDE, SODIUM LACTATE, POTASSIUM CHLORIDE, CALCIUM CHLORIDE 600; 310; 30; 20 MG/100ML; MG/100ML; MG/100ML; MG/100ML
INJECTION, SOLUTION INTRAVENOUS CONTINUOUS PRN
Status: DISCONTINUED | OUTPATIENT
Start: 2024-03-18 | End: 2024-03-18

## 2024-03-18 RX ORDER — SIMETHICONE 40MG/0.6ML
133 SUSPENSION, DROPS(FINAL DOSAGE FORM)(ML) ORAL
Status: DISCONTINUED | OUTPATIENT
Start: 2024-03-18 | End: 2024-03-18 | Stop reason: HOSPADM

## 2024-03-18 RX ORDER — ATROPINE SULFATE 0.1 MG/ML
1 INJECTION INTRAVENOUS
Status: DISCONTINUED | OUTPATIENT
Start: 2024-03-18 | End: 2024-03-18 | Stop reason: HOSPADM

## 2024-03-18 RX ORDER — ONDANSETRON 2 MG/ML
4 INJECTION INTRAMUSCULAR; INTRAVENOUS EVERY 6 HOURS PRN
Status: DISCONTINUED | OUTPATIENT
Start: 2024-03-18 | End: 2024-03-23 | Stop reason: HOSPADM

## 2024-03-18 RX ORDER — DIPHENHYDRAMINE HYDROCHLORIDE 50 MG/ML
25-50 INJECTION INTRAMUSCULAR; INTRAVENOUS
Status: DISCONTINUED | OUTPATIENT
Start: 2024-03-18 | End: 2024-03-18 | Stop reason: HOSPADM

## 2024-03-18 RX ORDER — HYDRALAZINE HYDROCHLORIDE 10 MG/1
10 TABLET, FILM COATED ORAL EVERY 4 HOURS PRN
Status: DISCONTINUED | OUTPATIENT
Start: 2024-03-18 | End: 2024-03-23 | Stop reason: HOSPADM

## 2024-03-18 RX ORDER — FLUMAZENIL 0.1 MG/ML
0.2 INJECTION, SOLUTION INTRAVENOUS
Status: ACTIVE | OUTPATIENT
Start: 2024-03-18 | End: 2024-03-19

## 2024-03-18 RX ORDER — BISMUTH SUBSALICYLATE 262 MG/1
262 TABLET, CHEWABLE ORAL
Status: DISCONTINUED | OUTPATIENT
Start: 2024-03-18 | End: 2024-03-23 | Stop reason: HOSPADM

## 2024-03-18 RX ORDER — FLUMAZENIL 0.1 MG/ML
0.2 INJECTION, SOLUTION INTRAVENOUS
Status: DISCONTINUED | OUTPATIENT
Start: 2024-03-18 | End: 2024-03-18 | Stop reason: HOSPADM

## 2024-03-18 RX ORDER — ONDANSETRON 2 MG/ML
4 INJECTION INTRAMUSCULAR; INTRAVENOUS EVERY 30 MIN PRN
Status: DISCONTINUED | OUTPATIENT
Start: 2024-03-18 | End: 2024-03-18 | Stop reason: HOSPADM

## 2024-03-18 RX ORDER — FERROUS SULFATE 325(65) MG
325 TABLET ORAL DAILY
Status: DISCONTINUED | OUTPATIENT
Start: 2024-03-18 | End: 2024-03-23 | Stop reason: HOSPADM

## 2024-03-18 RX ORDER — METRONIDAZOLE 500 MG/1
500 TABLET ORAL 3 TIMES DAILY
Status: DISCONTINUED | OUTPATIENT
Start: 2024-03-18 | End: 2024-03-23 | Stop reason: HOSPADM

## 2024-03-18 RX ORDER — FENTANYL CITRATE 50 UG/ML
50-100 INJECTION, SOLUTION INTRAMUSCULAR; INTRAVENOUS EVERY 5 MIN PRN
Status: DISCONTINUED | OUTPATIENT
Start: 2024-03-18 | End: 2024-03-18 | Stop reason: HOSPADM

## 2024-03-18 RX ORDER — PROPOFOL 10 MG/ML
INJECTION, EMULSION INTRAVENOUS PRN
Status: DISCONTINUED | OUTPATIENT
Start: 2024-03-18 | End: 2024-03-18

## 2024-03-18 RX ORDER — ONDANSETRON 4 MG/1
4 TABLET, ORALLY DISINTEGRATING ORAL EVERY 6 HOURS PRN
Status: DISCONTINUED | OUTPATIENT
Start: 2024-03-18 | End: 2024-03-23 | Stop reason: HOSPADM

## 2024-03-18 RX ORDER — EPINEPHRINE 1 MG/ML
0.1 INJECTION, SOLUTION INTRAMUSCULAR; SUBCUTANEOUS
Status: DISCONTINUED | OUTPATIENT
Start: 2024-03-18 | End: 2024-03-18 | Stop reason: HOSPADM

## 2024-03-18 RX ORDER — HYDRALAZINE HYDROCHLORIDE 20 MG/ML
10 INJECTION INTRAMUSCULAR; INTRAVENOUS EVERY 4 HOURS PRN
Status: DISCONTINUED | OUTPATIENT
Start: 2024-03-18 | End: 2024-03-23 | Stop reason: HOSPADM

## 2024-03-18 RX ORDER — SODIUM CHLORIDE, SODIUM LACTATE, POTASSIUM CHLORIDE, CALCIUM CHLORIDE 600; 310; 30; 20 MG/100ML; MG/100ML; MG/100ML; MG/100ML
INJECTION, SOLUTION INTRAVENOUS CONTINUOUS
Status: DISCONTINUED | OUTPATIENT
Start: 2024-03-18 | End: 2024-03-18 | Stop reason: HOSPADM

## 2024-03-18 RX ORDER — ONDANSETRON 4 MG/1
4 TABLET, ORALLY DISINTEGRATING ORAL EVERY 30 MIN PRN
Status: DISCONTINUED | OUTPATIENT
Start: 2024-03-18 | End: 2024-03-18 | Stop reason: HOSPADM

## 2024-03-18 RX ORDER — SODIUM CHLORIDE 9 MG/ML
INJECTION, SOLUTION INTRAVENOUS CONTINUOUS
Status: DISCONTINUED | OUTPATIENT
Start: 2024-03-18 | End: 2024-03-18

## 2024-03-18 RX ORDER — ONDANSETRON 2 MG/ML
INJECTION INTRAMUSCULAR; INTRAVENOUS PRN
Status: DISCONTINUED | OUTPATIENT
Start: 2024-03-18 | End: 2024-03-18

## 2024-03-18 RX ORDER — NALOXONE HYDROCHLORIDE 0.4 MG/ML
0.1 INJECTION, SOLUTION INTRAMUSCULAR; INTRAVENOUS; SUBCUTANEOUS
Status: DISCONTINUED | OUTPATIENT
Start: 2024-03-18 | End: 2024-03-18 | Stop reason: HOSPADM

## 2024-03-18 RX ORDER — LIDOCAINE 40 MG/G
CREAM TOPICAL
Status: DISCONTINUED | OUTPATIENT
Start: 2024-03-18 | End: 2024-03-23 | Stop reason: HOSPADM

## 2024-03-18 RX ORDER — CALCIUM CARBONATE 500 MG/1
1000 TABLET, CHEWABLE ORAL 4 TIMES DAILY PRN
Status: DISCONTINUED | OUTPATIENT
Start: 2024-03-18 | End: 2024-03-23 | Stop reason: HOSPADM

## 2024-03-18 RX ADMIN — PANTOPRAZOLE SODIUM 40 MG: 40 INJECTION, POWDER, FOR SOLUTION INTRAVENOUS at 19:04

## 2024-03-18 RX ADMIN — BISMUTH SUBSALICYLATE 262 MG: 262 TABLET, CHEWABLE ORAL at 19:04

## 2024-03-18 RX ADMIN — SODIUM CHLORIDE 8 MG/HR: 9 INJECTION, SOLUTION INTRAVENOUS at 08:53

## 2024-03-18 RX ADMIN — ONDANSETRON 4 MG: 2 INJECTION INTRAMUSCULAR; INTRAVENOUS at 17:07

## 2024-03-18 RX ADMIN — DOXYCYCLINE HYCLATE 100 MG: 100 CAPSULE ORAL at 19:04

## 2024-03-18 RX ADMIN — METRONIDAZOLE 500 MG: 500 TABLET ORAL at 19:04

## 2024-03-18 RX ADMIN — ROSUVASTATIN CALCIUM 40 MG: 40 TABLET, COATED ORAL at 01:48

## 2024-03-18 RX ADMIN — PROPOFOL 150 MG: 10 INJECTION, EMULSION INTRAVENOUS at 16:57

## 2024-03-18 RX ADMIN — DEXTROSE AND SODIUM CHLORIDE: 5; 450 INJECTION, SOLUTION INTRAVENOUS at 19:04

## 2024-03-18 RX ADMIN — DEXAMETHASONE SODIUM PHOSPHATE 10 MG: 10 INJECTION, SOLUTION INTRAMUSCULAR; INTRAVENOUS at 17:07

## 2024-03-18 RX ADMIN — SODIUM CHLORIDE, POTASSIUM CHLORIDE, SODIUM LACTATE AND CALCIUM CHLORIDE 1000 ML: 600; 310; 30; 20 INJECTION, SOLUTION INTRAVENOUS at 01:53

## 2024-03-18 RX ADMIN — ALBUMIN HUMAN 50 G: 0.25 SOLUTION INTRAVENOUS at 02:59

## 2024-03-18 RX ADMIN — PHENYLEPHRINE HYDROCHLORIDE 100 MCG: 10 INJECTION INTRAVENOUS at 16:57

## 2024-03-18 RX ADMIN — BISMUTH SUBSALICYLATE 262 MG: 262 TABLET, CHEWABLE ORAL at 22:23

## 2024-03-18 RX ADMIN — ROSUVASTATIN CALCIUM 40 MG: 40 TABLET, COATED ORAL at 22:23

## 2024-03-18 RX ADMIN — PHENYLEPHRINE HYDROCHLORIDE 100 MCG: 10 INJECTION INTRAVENOUS at 17:04

## 2024-03-18 RX ADMIN — SODIUM CHLORIDE: 9 INJECTION, SOLUTION INTRAVENOUS at 02:50

## 2024-03-18 RX ADMIN — Medication 80 MG: at 16:57

## 2024-03-18 RX ADMIN — SODIUM CHLORIDE, POTASSIUM CHLORIDE, SODIUM LACTATE AND CALCIUM CHLORIDE: 600; 310; 30; 20 INJECTION, SOLUTION INTRAVENOUS at 16:49

## 2024-03-18 ASSESSMENT — ACTIVITIES OF DAILY LIVING (ADL)
DEPENDENT_IADLS:: TRANSPORTATION
ADLS_ACUITY_SCORE: 44
ADLS_ACUITY_SCORE: 36
ADLS_ACUITY_SCORE: 44
ADLS_ACUITY_SCORE: 38
ADLS_ACUITY_SCORE: 44
ADLS_ACUITY_SCORE: 29
ADLS_ACUITY_SCORE: 44
ADLS_ACUITY_SCORE: 38
ADLS_ACUITY_SCORE: 44
ADLS_ACUITY_SCORE: 44
ADLS_ACUITY_SCORE: 36
ADLS_ACUITY_SCORE: 40
ADLS_ACUITY_SCORE: 36
ADLS_ACUITY_SCORE: 44
ADLS_ACUITY_SCORE: 43
ADLS_ACUITY_SCORE: 44
ADLS_ACUITY_SCORE: 44
ADLS_ACUITY_SCORE: 29
ADLS_ACUITY_SCORE: 36
ADLS_ACUITY_SCORE: 40
ADLS_ACUITY_SCORE: 40
ADLS_ACUITY_SCORE: 44
ADLS_ACUITY_SCORE: 44

## 2024-03-18 NOTE — ANESTHESIA CARE TRANSFER NOTE
Patient: Allen N Pha    Procedure: Procedure(s):  ESOPHAGOGASTRODUODENOSCOPY WITH BiCAP AND BIOPSY       Diagnosis: Gastrointestinal hemorrhage, unspecified gastrointestinal hemorrhage type [K92.2]  Diagnosis Additional Information: No value filed.    Anesthesia Type:   General     Note:    Oropharynx: oropharynx clear of all foreign objects and spontaneously breathing  Level of Consciousness: drowsy  Oxygen Supplementation: face mask  Level of Supplemental Oxygen (L/min / FiO2): 6  Independent Airway: airway patency satisfactory and stable  Dentition: dentition unchanged  Vital Signs Stable: post-procedure vital signs reviewed and stable  Report to RN Given: handoff report given  Patient transferred to: PACU    Handoff Report: Identifed the Patient, Identified the Reponsible Provider, Reviewed the pertinent medical history, Discussed the surgical course, Reviewed Intra-OP anesthesia mangement and issues during anesthesia, Set expectations for post-procedure period and Allowed opportunity for questions and acknowledgement of understanding      Vitals:  Vitals Value Taken Time   /70 03/18/24 1719   Temp 36.4  C (97.5  F) 03/18/24 1719   Pulse 60 03/18/24 1722   Resp 48 03/18/24 1722   SpO2 100 % 03/18/24 1722   Vitals shown include unfiled device data.    Electronically Signed By: DONALD Borja CRNA  March 18, 2024  5:24 PM

## 2024-03-18 NOTE — ANESTHESIA PREPROCEDURE EVALUATION
Anesthesia Pre-Procedure Evaluation    Patient: Allen Julian   MRN: [id number] : 1945        Procedure : Procedure(s):  ESOPHAGOGASTRODUODENOSCOPY          History reviewed. No pertinent past medical history.   Past Surgical History:   Procedure Laterality Date    ESOPHAGOSCOPY, GASTROSCOPY, DUODENOSCOPY (EGD), COMBINED N/A 10/7/2023    Procedure: ESOPHAGOGASTRODUODENOSCOPY;  Surgeon: sOiel Stinson MD;  Location: [facility] OR      No Known Allergies   Social History     Tobacco Use    Smoking status: Never    Smokeless tobacco: Never   Substance Use Topics    Alcohol use: Never      Wt Readings from Last 1 Encounters:   24 61.2 kg (135 lb)        Anesthesia Evaluation   Pt has had prior anesthetic.     No history of anesthetic complications       ROS/MED HX  ENT/Pulmonary:  - neg pulmonary ROS     Neurologic:  - neg neurologic ROS     Cardiovascular: Comment: Aortic root dilation     (+) Dyslipidemia hypertension- -  CAD -  - -                           valvular problems/murmurs type: AI     Previous cardiac testing   Echo: Date: 10/7/23 Results:  Interpretation Summary     1. The left ventricle is mildly dilated. Left ventricular function is  normal.The ejection fraction is 55-60%. There is severe concentric left  ventricular hypertrophy. Mild hypokinesis of the lateral wall segments of the  left ventricle.  2. Normal right ventricle size and systolic function.  3. There is severe (4+) aortic regurgitation.  4. There is severe aortic root enlargement at 52 mm.  The ascending aorta is mildly dilated at 42 mm.    Stress Test:  Date: Results:    ECG Reviewed:  Date: Results:    Cath:  Date: Results:      METS/Exercise Tolerance:     Hematologic:     (+)      anemia,          Musculoskeletal:  - neg musculoskeletal ROS     GI/Hepatic: Comment: Hematemesis and rectal bleeding     (+) GERD, Symptomatic,                  Renal/Genitourinary:     (+) renal disease, type: CRI, Pt does not require  "dialysis,           Endo:     (+)          thyroid problem, hypothyroidism,           Psychiatric/Substance Use:  - neg psychiatric ROS     Infectious Disease:  - neg infectious disease ROS     Malignancy:  - neg malignancy ROS     Other:  - neg other ROS          Physical Exam    Airway  airway exam normal      Mallampati: III   TM distance: > 3 FB   Neck ROM: full   Mouth opening: > 3 cm    Respiratory Devices and Support         Dental  no notable dental history   Comment: Upper dentures         Cardiovascular   cardiovascular exam normal       Rhythm and rate: regular and normal     Pulmonary   pulmonary exam normal        breath sounds clear to auscultation           OUTSIDE LABS:  CBC:   Lab Results   Component Value Date    WBC 7.7 03/18/2024    WBC 11.5 (H) 03/17/2024    HGB 8.0 (L) 03/18/2024    HGB 7.8 (L) 03/18/2024    HCT 24.1 (L) 03/18/2024    HCT 25.3 (L) 03/17/2024     (L) 03/18/2024     03/17/2024     BMP:   Lab Results   Component Value Date     03/18/2024     03/17/2024    POTASSIUM 5.3 03/18/2024    POTASSIUM 6.1 (HH) 03/18/2024    CHLORIDE 112 (H) 03/18/2024    CHLORIDE 111 (H) 03/17/2024    CO2 19 (L) 03/18/2024    CO2 20 (L) 03/17/2024    BUN 51.0 (H) 03/18/2024    BUN 57.6 (H) 03/17/2024    CR 1.75 (H) 03/18/2024    CR 1.93 (H) 03/17/2024    GLC 87 03/18/2024    GLC 91 03/18/2024     COAGS:   Lab Results   Component Value Date    PTT 23 03/17/2024    INR 1.06 03/17/2024     POC: No results found for: \"BGM\", \"HCG\", \"HCGS\"  HEPATIC:   Lab Results   Component Value Date    ALBUMIN 3.4 (L) 03/18/2024    PROTTOTAL 4.9 (L) 03/18/2024    ALT 12 03/18/2024    AST 18 03/18/2024    ALKPHOS 39 (L) 03/18/2024    BILITOTAL 0.8 03/18/2024     OTHER:   Lab Results   Component Value Date    LACT 1.8 03/18/2024    A1C 5.6 03/17/2024    SAVI 7.8 (L) 03/18/2024    PHOS 3.8 03/17/2024    MAG 2.0 03/17/2024    LIPASE 22 10/06/2023    TSH 17.51 (H) 10/06/2023    T4 0.74 (L) 10/06/2023 "       Anesthesia Plan    ASA Status:  4    NPO Status:  NPO Appropriate    Anesthesia Type: General.     - Airway: ETT   Induction: Intravenous, RSI, Propofol.   Maintenance: Balanced.   Techniques and Equipment:     - Airway: Video-Laryngoscope       Consents    Anesthesia Plan(s) and associated risks, benefits, and realistic alternatives discussed. Questions answered and patient/representative(s) expressed understanding.     - Discussed:     - Discussed with:  Patient            Postoperative Care    Pain management: IV analgesics, Oral pain medications, Multi-modal analgesia.   PONV prophylaxis: Ondansetron (or other 5HT-3), Dexamethasone or Solumedrol, Droperidol or Haldol     Comments:               Tony Shepherd MD    I have reviewed the pertinent notes and labs in the chart from the past 30 days and (re)examined the patient.  Any updates or changes from those notes are reflected in this note.    # Hyperkalemia: Highest K = 6.1 mmol/L in last 2 days, will monitor as appropriate       # Hypoalbuminemia: Lowest albumin = 3 g/dL at 3/17/2024 10:57 PM, will monitor as appropriate    # Drug Induced Platelet Defect: home medication list includes an antiplatelet medication  # Overweight: Estimated body mass index is 26.37 kg/m  as calculated from the following:    Height as of this encounter: 1.524 m (5').    Weight as of this encounter: 61.2 kg (135 lb).

## 2024-03-18 NOTE — CONSULTS
"Care Management Initial Consult    General Information  Assessment completed with: Patient, Children, Allen and marnie Gautam  Type of CM/SW Visit: Initial Assessment    Primary Care Provider verified and updated as needed: Yes   Readmission within the last 30 days: no previous admission in last 30 days      Reason for Consult: discharge planning  Advance Care Planning: Advance Care Planning Reviewed: no concerns identified          Communication Assessment  Patient's communication style: spoken language (non-English) (speaks Hmong)             Cognitive  Cognitive/Neuro/Behavioral: WDL                      Living Environment:   People in home: child(uvaldo), adult, grandchild(uvaldo)  marnie Gautam, his wife, and grandkids, marnie Cano  Current living Arrangements: house      Able to return to prior arrangements: yes       Family/Social Support:  Care provided by: self, child(uvaldo)  Provides care for: no one  Marital Status:   Children          Description of Support System: Supportive, Involved    Support Assessment: Adequate family and caregiver support, Adequate social supports, Patient communicates needs well met    Current Resources:   Patient receiving home care services: No     Community Resources: None  Equipment currently used at home: none (\"has a cane and FWW at home, but doesn't normally need to use either\".)  Supplies currently used at home: None    Employment/Financial:  Employment Status: retired     Employment/ Comments: \"no  history\"  Financial Concerns:     Referral to Financial Worker: No       Does the patient's insurance plan have a 3 day qualifying hospital stay waiver?  No      Functional Status:  Prior to admission patient needed assistance:   Dependent ADLs:: Ambulation-no assistive device, Independent  Dependent IADLs:: Transportation       Mental Health Status:          Chemical Dependency Status:                Values/Beliefs:  Spiritual, Cultural Beliefs, Denominational Practices, " Values that affect care:                 Additional Information:  Allen lives in a house with his son Dain, Dain's wife, and grandkids, son Jerome  He is independent with ADLs and IADLs and family helps as needed.     Unknown discharge needs at this time.    Family to transport at discharge.    CM to follow for medical progression of care, discharge recommendations, and final discharge plan.    Dianne Hamilton RN

## 2024-03-18 NOTE — ED PROVIDER NOTES
EMERGENCY DEPARTMENT ENCOUNTER      NAME: Allen Julian  AGE: 78 year old male  YOB: 1945  MRN: [id number]  EVALUATION DATE & TIME: 3/17/2024 10:52 PM    PCP: Katie Cox    ED PROVIDER: Jeniffer Russo MD    Chief Complaint   Patient presents with    Rectal Bleeding    Abdominal Pain         FINAL IMPRESSION:  1. Gastrointestinal hemorrhage, unspecified gastrointestinal hemorrhage type    2. BRBPR (bright red blood per rectum)          ED COURSE & MEDICAL DECISION MAKING:    Pertinent Labs & Imaging studies reviewed. (See chart for details)  78 year old male with history of HTN, CAD, aortic regurgitation with previous episode of GI bleed October 2023 with ulcer in the stomach and duodenum who presents to the Emergency Department for evaluation of rectal bleeding, just for the last 2 hours with several episodes of feeling the toilet bowl with blood.  Has also had some emesis with blood in it reported but is unable to quantify this.  Patient arrives hemodynamically stable though pale, ill-appearing and is symptomatically short of breath.  He did have some abdominal pain earlier but this is since resolved and his abdomen is benign at this time.  Thankfully he has not had any thinners other than a baby aspirin and has been compliant with his pantoprazole.  Strong concern for recurrent upper GI bleed given his associated hematemesis, concern for ulcer given history of same.  Differential includes gastritis, Emerald-Sheth tear.  He does not have any under lying no history of liver disease or varices per chart review.  Given the bright burgundy blood per rectum concern for brisk upper GI bleed and impending hemodynamic instability.  Lower GI bleed is on the differential but with his hematemesis I think this is less likely.    Patient placed on monitor, 2 large-bore IVs were established, blood obtained.  Patient given IV PPI, 1L LR.  Ordered 2 units of PRBC for transfusion.  CBC, BMP, LFTs, coags, lactate,'s type  and screen notable for hemoglobin of 8.1 down from 9.35 months ago.  Acute on chronic renal insufficiency with creatinine 1.9 up from 1.6-1.7 previous.  BUN is elevated at 57.  Lactate of 2.4.    Patient had a 300 mL output of burgundy stool.  Remains hemodynamically stable.    Sent for CTA of the abdomen pelvis, unremarkable for acute abnormalities.  Patient has thankfully remained hemodynamically stable.  Case discussed with GI who plans for morning endoscopy.  Should patient become hemodynamically unstable, call them back and they will do endoscopy overnight.  Admitted to hospitalist service in ICU pending his endoscopy.       ED Course as of 03/18/24 0114   Sun Mar 17, 2024   2314 Hemoglobin(!): 8.1   2314 Lactic Acid(!): 2.4   2323 Creatinine(!): 1.93  1.6-1.7 previous   2323 Urea Nitrogen(!): 57.6  27-29 prev   2343 300ml burgundy bloody stool output    Mon Mar 18, 2024   0048 Spoke w Dr. Keen, GI   0101 Admitted to Dr. Gondal       Medical Decision Making    History:  Supplemental history from: Documented in chart and Family Member/Significant Other  External Record(s) reviewed: Documented in chart and Other: Hospitalization at [facility] from 10/06/2023-10/08/2023    Work Up:  Chart documentation includes differential considered and any EKGs or imaging independently interpreted by provider, see MDM  In additional to work up documented, I considered the following work up: see MDM    External consultation:  Discussion of management with another provider: GI, hospitalist    Complicating factors:  Care impacted by chronic illness: Hypertension, CAD  Care affected by social determinants of health: Access to Medical Care weekend night shift no access to PCP    Disposition considerations: Admit.        At the conclusion of the encounter I discussed the results of all of the tests and the disposition. The questions were answered. The patient or family acknowledged understanding and was agreeable with the  care plan.    CRITICAL CARE:  Critical Care  Performed by: Jeniffer Russo MD  Authorized by: Jeniffer Russo MD     Total critical care time: 56 minutes  Criticalcare time was exclusive of separately billable procedures and treating other patients.  Critical care was necessary to treat or prevent imminent or life-threatening deterioration of the following conditions: GIB  Critical care was time spent personally by me on the following activities: development of treatment plan with patient or surrogate, discussions with consultants, examination of patient, evaluation of patient's response totreatment, obtaining history from patient or surrogate, ordering and performing treatments and interventions, ordering and review of laboratory studies, ordering and review of radiographic studies and re-evaluation ofpatient's condition, this excludes any separately billable procedures.      MEDICATIONS GIVEN IN THE EMERGENCY:  Medications   lactated ringers BOLUS 1,000 mL (has no administration in time range)   lidocaine 1 % 0.1-1 mL (has no administration in time range)   lidocaine (LMX4) cream (has no administration in time range)   sodium chloride (PF) 0.9% PF flush 3 mL (has no administration in time range)   sodium chloride (PF) 0.9% PF flush 3 mL (has no administration in time range)   senna-docusate (SENOKOT-S/PERICOLACE) 8.6-50 MG per tablet 1 tablet (has no administration in time range)     Or   senna-docusate (SENOKOT-S/PERICOLACE) 8.6-50 MG per tablet 2 tablet (has no administration in time range)   calcium carbonate (TUMS) chewable tablet 1,000 mg (has no administration in time range)   hydrALAZINE (APRESOLINE) tablet 10 mg (has no administration in time range)     Or   hydrALAZINE (APRESOLINE) injection 10 mg (has no administration in time range)   ondansetron (ZOFRAN ODT) ODT tab 4 mg (has no administration in time range)     Or   ondansetron (ZOFRAN) injection 4 mg (has no administration in time range)   sodium  chloride 0.9 % infusion (has no administration in time range)   acetaminophen (TYLENOL) tablet 500 mg (has no administration in time range)   ferrous sulfate (FE TABS) EC tablet 325 mg (has no administration in time range)   rosuvastatin (CRESTOR) tablet 40 mg (has no administration in time range)   pantoprazole (PROTONIX) IV push injection 40 mg (has no administration in time range)   pantoprazole (PROTONIX) IV push injection 80 mg (80 mg Intravenous $Given 3/17/24 5834)   iopamidol (ISOVUE-370) solution 80 mL (75 mLs Intravenous $Given 3/17/24 1498)       NEW PRESCRIPTIONS STARTED AT TODAY'S ER VISIT  New Prescriptions    No medications on file          =================================================================    HPI    Patient information was obtained from: The patient and his daughter in law    Use of Intrepreter: Yes, Hmong,  services provided by daughter-in-law     Allen Julian is a 78 year old male with pertinent medical history of CATALINO, hypertension, gastrointestinal hemorrhage who presents to the ER via walk-in.    Per chart review, the patient was admitted at [facility] from 10/06/2023-10/08/2023 for a gastrointestinal hemorrhage. He was found to be anemic with Hgb at 6.3. He as transfused 2 units of pRBCs. Hgb improved to 9.3 on day of discharge. He was dischrged with newly started medication of levothyroxine, amlodipine, and pantoprazole.    The patient reports around 9 PM he felt nauseous and had about 6 bloody bowel movements every other 15-30 minutes with a notable burgundy blood color. He has also had a few episodes of hematemesis. He is short of breath and feels generally weak. He associates general abdominal pain due to emesis. He needed assistance getting out of the vehicle at the ED parking lot. He reports recently having a gastrointestinal hemorrhage and has been compliant with his pantoprazole medication. He takes aspirin, but denies taking any other blood  thinners.    Otherwise, the patient denied any other medical complaints or concerns at this time.          PAST MEDICAL HISTORY:  History reviewed. No pertinent past medical history.    PAST SURGICAL HISTORY:  Past Surgical History:   Procedure Laterality Date    ESOPHAGOSCOPY, GASTROSCOPY, DUODENOSCOPY (EGD), COMBINED N/A 10/7/2023    Procedure: ESOPHAGOGASTRODUODENOSCOPY;  Surgeon: Osiel Stinson MD;  Location: [facility] Main OR       CURRENT MEDICATIONS:    Prior to Admission Medications   Prescriptions Last Dose Informant Patient Reported? Taking?   ASPIRIN LOW DOSE 81 MG EC tablet 3/17/2024 at am Daughter Yes Yes   Sig: Take 81 mg by mouth daily   acetaminophen (TYLENOL) 500 MG tablet Unknown at prn Other, Daughter Yes Yes   Sig: Take 325-650 mg by mouth every 6 hours as needed for mild pain   amLODIPine-valsartan (EXFORGE)  MG tablet 3/17/2024 at am Daughter Yes Yes   Sig: Take 1 tablet by mouth daily   ferrous sulfate (FE TABS) 325 (65 Fe) MG EC tablet 3/17/2024 at am Other, Daughter Yes Yes   Sig: Take 325 mg by mouth daily   hydrALAZINE (APRESOLINE) 100 MG tablet 3/17/2024 at pm Daughter Yes Yes   Sig: Take 100 mg by mouth 3 times daily   isosorbide mononitrate (IMDUR) 30 MG 24 hr tablet 3/17/2024 at am Daughter Yes Yes   Sig: Take 30 mg by mouth daily   pantoprazole (PROTONIX) 40 MG EC tablet 3/17/2024 at pm Daughter No Yes   Sig: Take 1 tablet (40 mg) by mouth 2 times daily   rosuvastatin (CRESTOR) 40 MG tablet 3/16/2024 at hs Daughter Yes Yes   Sig: Take 40 mg by mouth at bedtime   tiZANidine (ZANAFLEX) 2 MG tablet Unknown at prn Daughter Yes Yes   Sig: Take 2-4 mg by mouth every 6 hours as needed for muscle spasms      Facility-Administered Medications: None       ALLERGIES:  No Known Allergies    FAMILY HISTORY:  No family history on file.    SOCIAL HISTORY:  Social History     Tobacco Use    Smoking status: Never    Smokeless tobacco: Never   Substance Use Topics    Alcohol use: Never     Drug use: Never        VITALS:  Patient Vitals for the past 24 hrs:   BP Temp Temp src Pulse Resp SpO2 Height Weight   03/18/24 0040 (!) 144/67 -- -- 63 25 95 % -- --   03/18/24 0035 (!) 145/66 -- -- 64 20 97 % -- --   03/18/24 0030 (!) 145/64 -- -- 64 20 97 % -- --   03/18/24 0025 (!) 152/67 -- -- 64 19 97 % -- --   03/18/24 0024 (!) 148/67 -- -- 63 19 97 % -- --   03/18/24 0017 (!) 144/66 97.9  F (36.6  C) Oral 63 20 96 % -- --   03/18/24 0001 (!) 149/65 -- -- 63 19 98 % -- --   03/18/24 0000 (!) 163/70 -- -- 64 14 -- -- --   03/17/24 2318 -- -- -- 75 21 90 % -- --   03/17/24 2315 133/60 -- -- 75 20 92 % -- --   03/17/24 2306 (!) 148/67 -- -- 76 21 94 % -- --   03/17/24 2254 -- -- -- -- -- 94 % 1.524 m (5') 61.2 kg (135 lb)   03/17/24 2248 (!) 152/69 97.7  F (36.5  C) Oral 80 24 -- -- --       PHYSICAL EXAM    General Appearance: Ill-appearing, diaphoretic, pale, slightly hyperventilating  Head:  Normocephalic, atraumatic  Eyes:   conjunctiva/corneas clear  ENT:  membranes are moist without pallor  Neck:  Supple  Cardio:  Regular rate and rhythm, no murmur/gallop/rub  Pulm:  No respiratory distress, clear to auscultation bilaterally  Back: No CVA tenderness, normal ROM  Abdomen:  Soft, non-tender, non distended,no rebound or guarding.  Extremities: Nonedematous  Skin:  Skin warm, dry, no rashes  Neuro:  Alert and oriented ×3, moving all extremities, no gross sensory defects         RADIOLOGY/LABS:  Reviewed all pertinent imaging. Please see official radiology report. All pertinent labs reviewed and interpreted.    Results for orders placed or performed during the hospital encounter of 03/17/24   CTA Abdomen Pelvis with Contrast    Impression    IMPRESSION:  1.  No acute GI bleeding.  2.  Diffuse colonic diverticulosis, without definite evidence of diverticulitis.  3.  Mild diffuse hepatic steatosis.  4.  Stable left common and internal iliac artery aneurysms measuring 2.2 cm and 1.8 cm, respectively.  5.  Diffuse  thickening of the urinary bladder. Cystitis is possible and correlation with urinalysis is recommended.   Result Value Ref Range    INR 1.06 0.85 - 1.15   Partial thromboplastin time   Result Value Ref Range    aPTT 23 22 - 38 Seconds   Basic metabolic panel   Result Value Ref Range    Sodium 140 135 - 145 mmol/L    Potassium 5.2 3.4 - 5.3 mmol/L    Chloride 111 (H) 98 - 107 mmol/L    Carbon Dioxide (CO2) 20 (L) 22 - 29 mmol/L    Anion Gap 9 7 - 15 mmol/L    Urea Nitrogen 57.6 (H) 8.0 - 23.0 mg/dL    Creatinine 1.93 (H) 0.67 - 1.17 mg/dL    GFR Estimate 35 (L) >60 mL/min/1.73m2    Calcium 8.0 (L) 8.8 - 10.2 mg/dL    Glucose 165 (H) 70 - 99 mg/dL   Hepatic panel   Result Value Ref Range    Protein Total 4.9 (L) 6.4 - 8.3 g/dL    Albumin 3.0 (L) 3.5 - 5.2 g/dL    Bilirubin Total 0.4 <=1.2 mg/dL    Alkaline Phosphatase 45 40 - 150 U/L    AST 25 0 - 45 U/L    ALT 17 0 - 70 U/L    Bilirubin Direct <0.20 0.00 - 0.30 mg/dL   Lactic acid whole blood   Result Value Ref Range    Lactic Acid 2.4 (H) 0.7 - 2.0 mmol/L   CBC with platelets and differential   Result Value Ref Range    WBC Count 11.5 (H) 4.0 - 11.0 10e3/uL    RBC Count 2.66 (L) 4.40 - 5.90 10e6/uL    Hemoglobin 8.1 (L) 13.3 - 17.7 g/dL    Hematocrit 25.3 (L) 40.0 - 53.0 %    MCV 95 78 - 100 fL    MCH 30.5 26.5 - 33.0 pg    MCHC 32.0 31.5 - 36.5 g/dL    RDW 14.2 10.0 - 15.0 %    Platelet Count 172 150 - 450 10e3/uL    % Neutrophils 48 %    % Lymphocytes 29 %    % Monocytes 10 %    % Eosinophils 12 %    % Basophils 1 %    % Immature Granulocytes 0 %    NRBCs per 100 WBC 0 <1 /100    Absolute Neutrophils 5.5 1.6 - 8.3 10e3/uL    Absolute Lymphocytes 3.3 0.8 - 5.3 10e3/uL    Absolute Monocytes 1.2 0.0 - 1.3 10e3/uL    Absolute Eosinophils 1.4 (H) 0.0 - 0.7 10e3/uL    Absolute Basophils 0.1 0.0 - 0.2 10e3/uL    Absolute Immature Granulocytes 0.0 <=0.4 10e3/uL    Absolute NRBCs 0.0 10e3/uL   Extra Red Top Tube   Result Value Ref Range    Hold Specimen JIC    Reticulocyte  count   Result Value Ref Range    % Reticulocyte 1.9 0.5 - 2.0 %    Absolute Reticulocyte 0.051 0.025 - 0.095 10e6/uL   Adult Type and Screen   Result Value Ref Range    ABO/RH(D) O POS     Antibody Screen Negative Negative    SPECIMEN EXPIRATION DATE 20240320235900    Prepare red blood cells (unit)   Result Value Ref Range    Blood Component Type Red Blood Cells     Product Code P4134L16     Unit Status Transfused     Unit Number [id number]     CROSSMATCH Compatible     CODING SYSTEM [id number]     ISSUE DATE AND TIME 20240318000400     UNIT ABO/RH O+     UNIT TYPE ISBT 5100    Prepare red blood cells (unit)   Result Value Ref Range    Blood Component Type Red Blood Cells     Product Code H8601C06     Unit Status Ready for issue     Unit Number [id number]     CROSSMATCH Compatible     CODING SYSTEM [id number]        EKG:  Performed at: 03/17/2024 at 22:59    Impression: Sinus rhythm, possible left atrial enlargement, left axis deviation, left ventricular hypertrophy with repolarization abnormality.    Rate: 75 bpm  Rhythm: Sinus rhythm  Axis: -37  HI Interval: 174 ms  QRS Interval: 106 ms  QTc Interval: 437 ms  ST Changes: No ST changes  Comparison: When compared with ECG of 04/15/2006 at 11:45, Incomplete right bundle branch block is no longer present  I have independently reviewed and interpreted the EKG(s) documented above.    The creation of this record is based on the scribe s observations of the work being performed by Jeniffer Russo MD and the provider s statements to them. It was created on her behalf by Adams Fernández, a trained medical scribe. This document has been checked and approved by the attending provider.    Jeniffer Russo MD  Emergency Medicine  [facility] EMERGENCY DEPARTMENT  [address] [place] [id number]  [phone]  Dept: [phone]     Jeniffer Russo MD  03/18/24 0115

## 2024-03-18 NOTE — ED TRIAGE NOTES
Pt was assisted from vehicle to wheelchair. He has been having blood in vomit and significant rectal bleeding this evening. At least six, large episodes of rectal bleeding. Pt is short of breath with an increased work of breathing noted. He is pale and has generalized weakness. He endorses abdominal pain. Pt has history of ulcers.      Triage Assessment (Adult)       Row Name 03/17/24 7253          Triage Assessment    Airway WDL WDL        Respiratory WDL    Respiratory WDL rhythm/pattern     Rhythm/Pattern, Respiratory shortness of breath;labored        Skin Circulation/Temperature WDL    Skin Circulation/Temperature WDL WDL        Cardiac WDL    Cardiac WDL WDL        Peripheral/Neurovascular WDL    Peripheral Neurovascular WDL WDL        Cognitive/Neuro/Behavioral WDL    Cognitive/Neuro/Behavioral WDL WDL        [place] Coma Scale    Best Eye Response 4-->(E4) spontaneous     Best Motor Response 6-->(M6) obeys commands     Best Verbal Response 5-->(V5) oriented     [place] Coma Scale Score 15

## 2024-03-18 NOTE — PROGRESS NOTES
Patient was admitted by my colleague earlier this morning.  Admission H&P reviewed. Agree with the assessments and plan.    Nurse reported K 6.1, which was 5.2 just before midnight. Will recheck. On Tele.     Rechecked K 5.3.       A/P  Allen Julian is a 78 year old male admitted on 3/17/2024. He  presented to the ER with complaint of hematemesis, rectal bleeding, Paler, of note patient was admitted to [facility] in October 2023 for gastrointestinal hemorrhage and was found to be anemic, was transfused 2 units of PRBCs s/p which hemoglobin improved and patient was subsequently discharged, in the ED 2 units of packed red blood cells were ordered along with Protonix, GI was consulted who recommended close monitoring and possible EGD in a.m., CT scan of the abdomen was performed which showed no acute process, patient is going to be admitted for GI bleeding, CATALINO and blood loss anemia.     Problem list and plan:  Hematemesis and per rectal bleed possibly secondary to upper GI bleeding  H/o PUD with + H Pylori (both gastric and duodenal ulcers)  Blood loss anemia  Mild leukocytosis most likely reactive secondary to above  Presented with multiple episodes of hematemesis and per rectal bleeding and was becoming pale so was brought to the ER  Blood transfusion was ordered in the ED, along with Protonix  Will continue with IV Protonix twice daily  GI was consulted: plan for EGD.  Continue to keep patient n.p.o. for now  Monitor vital signs closely  CT scan of the abdomen did not show any acute process or active bleeding  Monitor CBC, follow-up iron panel  Monitor fever and WBC curve  Holding aspirin for now, may restart once appropriate  -defer to GI for H pylori treatement       Diffuse thickening of the urinary bladder on imaging  CT scan of the abdomen showed diffuse thickening of the urinary bladder. Cystitis is possible  No current urinary complaints  Follow-up UA with reflex microscopy     Left common and internal  iliac artery aneurysm on imaging  CT scan of the abdomen showed stable left common and internal iliac artery aneurysms measuring 2.2 cm and 1.8 cm  May need outpatient repeat imaging at the PCP office in a few months s/p discharge to follow-up     CATALINO on CKD stage III  Normal anion gap metabolic acidosis  Hypoalbuminemia  Lactic acidosis  Suspecting CATALINO most likely in setting of blood loss and prerenal  Baseline creatinine of around 1.5. Up to 1.93  Gentle IV hydration  Blood transfusion  Monitor renal function closely  Monitor for worsening acidosis  Albumin repleted  Trend lactate levels     Mild hyperglycemia most likely reactive  Hemoglobin A1c 5.6  Monitor blood sugar levels intermittently     History of hypertension  Monitor vital signs as per protocol  IV hydralazine as needed for elevated blood pressure  Holding blood pressure medication because of GI bleeding  Restart blood pressure medication once appropriate     History of hyperlipidemia  Holding aspirin for now as above  Continue with statin     Physical deconditioning/weakness  PT and OT evaluation  Fall precautions     DVT PPx  Intermittent pneumatic compression     CODE STATUS  Full code as per discussion with patient and family at bedside

## 2024-03-18 NOTE — H&P (VIEW-ONLY)
CONSULTING PHYSICIAN   Malcom Hanson MD     REASON FOR CONSULTATION   Hematemesis, rectal bleeding      CHIEF COMPLAINT   Allen Julian came to the hospital for evaluation of GIB      HISTORY OF PRESENT ILLNESS   Allen Julian is a 78 year old male with HTN, DM, hypothyrodism, CAD, angioedema admitted with rectal bleeding, hematemesis.   Patient recently in the hospital in 10/2023 with GIB and found to have gastric/duodenal ulcers, positive for H Pylori. Patient was discharged on pantoprazole but unclear if he ever took treatment for H Pylori    Patient returned to the ER with epidose of hematemesis and rectal bleeding. Initially the rectal bleeding was red and transitioned to black stool.     Denies NSAIDS. Reports adherence to PPI therapy     States he has never had a colonoscopy     Saw Cardiology and planned for AVR and bypass due to diffuse CAD on angiogram      PAST HISTORY   History reviewed. No pertinent past medical history.   Past Surgical History:   Procedure Laterality Date    ESOPHAGOSCOPY, GASTROSCOPY, DUODENOSCOPY (EGD), COMBINED N/A 10/7/2023    Procedure: ESOPHAGOGASTRODUODENOSCOPY;  Surgeon: Osiel Stinson MD;  Location: [facility] [place] OR        Family History Social History   No family history on file. Social History     Tobacco Use    Smoking status: Never    Smokeless tobacco: Never   Substance Use Topics    Alcohol use: Never    Drug use: Never          MEDICATIONS & ALLERGIES   (Not in a hospital admission)       ALLERGIES   No Known Allergies      REVIEW OF SYSTEMS   A comprehensive review of systems was performed and was otherwise noncontributory.     OBJECTIVE   Vitals Blood pressure (!) 153/67, pulse 60, temperature 98.9  F (37.2  C), temperature source Oral, resp. rate 24, height 1.524 m (5'), weight 61.2 kg (135 lb), SpO2 94%.           Physical  Exam  GENERAL: alert and oriented, well nourished in no apparent distress   SKIN: warm and dry, no  rashes   HEENT: atraumatic, anicteric, moist mucous membranes, neck soft/supple    PULMONARY: normal resp effort, breath sounds clear to auscultation bilateral   CARDIOVASCULAR: positive for diastolic murmur   ABDOMEN: no tenderness, no distention, bowel sounds normal   MUSCULOSKELETAL: joints and gait normal   NEUROLOGICAL: appropriate mental status, grossly intact  DERM: no rash, no jaundice   PSYCHIATRIC: normal mood, affect and insight        LABORATORY    ELECTROLYTE PANEL   Recent Labs   Lab 03/18/24  0959 03/18/24  0901 03/18/24  0635 03/17/24  2257   NA  --   --  138 140   POTASSIUM  --  5.3 6.1* 5.2   CHLORIDE  --   --  112* 111*   CO2  --   --  19* 20*   GLC 87  --  91 165*   CR  --   --  1.75* 1.93*   BUN  --   --  51.0* 57.6*      HEMATOLOGY PANEL   Recent Labs   Lab 03/18/24  0635 03/17/24  2257   HGB 7.8* 8.1*   MCV 91 95   WBC 7.7 11.5*   * 172   INR  --  1.06      LIVER AND PANCREAS PANEL   Recent Labs   Lab 03/18/24  0635 03/17/24  2257   AST 18 25   ALT 12 17   ALKPHOS 39* 45   BILITOTAL 0.8 0.4     IMAGING STUDIES    EXAM: CTA ABDOMEN PELVIS WITH CONTRAST  LOCATION: [facility]  DATE: 3/18/2024     INDICATION: History of gib, presenting with bright red blood per rectum, emesis with blood.  COMPARISON: CTA abdomen/pelvis with contrast 10/06/2023  TECHNIQUE: CT angiogram abdomen pelvis during arterial phase of injection of IV contrast. 2D and 3D MIP reconstructions were performed by the CT technologist. Dose reduction techniques were used.  CONTRAST: isovue 370 75ml     FINDINGS:  ANGIOGRAM ABDOMEN/PELVIS: No acute GI bleeding. No aortic aneurysm or dissection. Conventional hepatic arterial anatomy. No significant stenosis of the celiac artery or superior mesenteric artery. Inferior mesenteric artery and renal arteries appear   patent without stenoses. Scattered atherosclerotic calcifications of the aortoiliac vessels. No AAA. Left common iliac artery aneurysm  measuring 2.2 cm (series 7 image 267), stable. Left internal iliac artery aneurysm measuring 1.8 cm (series 7 image   294), stable.     LOWER CHEST: Stable massive cardiomegaly. Right lower lobe calcified granuloma.     HEPATOBILIARY: Mild diffuse hepatic steatosis. Unremarkable gallbladder.     PANCREAS: Normal.     SPLEEN: Normal.     ADRENAL GLANDS: Normal.     KIDNEYS/BLADDER: Renal cysts which require no dedicated follow-up. No hydronephrosis. Diffuse thickening of the urinary bladder.     BOWEL: Diffuse colonic diverticulosis. No bowel obstruction.     LYMPH NODES: Normal.     PELVIC ORGANS: Prominent prostate.     MUSCULOSKELETAL: Small fat-containing inguinal hernias. Multilevel degenerative changes of the thoracic and lumbosacral spine. Scoliosis. No acute osseous abnormality.                                                                      IMPRESSION:  1.  No acute GI bleeding.  2.  Diffuse colonic diverticulosis, without definite evidence of diverticulitis.  3.  Mild diffuse hepatic steatosis.  4.  Stable left common and internal iliac artery aneurysms measuring 2.2 cm and 1.8 cm, respectively.  5.  Diffuse thickening of the urinary bladder. Cystitis is possible and correlation with urinalysis is recommended.    I have reviewed the current diagnostic and laboratory tests.           IMPRESSION   Allen Julian is a 78 year old male with hx of H Pylori PUD returning with GIB    GIB - suspect PUD bleeding. Reports adherence to PPI but uncertain if he was treated for H Pylori   EGD in OR given cardiac status  NPO   Continue PPI             Nic Naylor MD  Thank you for the opportunity to participate in the care of this patient.   Please feel free to call me with any questions or concerns.  Phone number [phone].

## 2024-03-18 NOTE — ANESTHESIA PROCEDURE NOTES
Airway       Patient location during procedure: OR       Procedure Start/Stop Times: 3/18/2024 4:58 PM  Staff -        CRNA: Tatum Solorzano APRN CRNA       Performed By: CRNAIndications and Patient Condition       Indications for airway management: fahad-procedural       Induction type:RSI       Mask difficulty assessment: 0 - not attempted    Final Airway Details       Final airway type: endotracheal airway       Successful airway: Oral  Endotracheal Airway Details        ETT size (mm): 7.5       Cuffed: yes       Successful intubation technique: video laryngoscopy       VL Blade Size: Glidescope 4       Grade View of Cords: 1       Adjucts: stylet       Position: Right       Measured from: gums/teeth       Secured at (cm): 22       Bite block used: None    Post intubation assessment        Placement verified by: capnometry, equal breath sounds and chest rise        Number of attempts at approach: 1       Number of other approaches attempted: 0       Secured with: tape       Ease of procedure: easy       Dentition: Intact and Unchanged    Medication(s) Administered   Medication Administration Time: 3/18/2024 4:58 PM

## 2024-03-18 NOTE — PROGRESS NOTES
Occupational Therapy Discharge Summary    Reason for therapy discharge:    All goals and outcomes met, no further needs identified.    Progress towards therapy goal(s). See goals on Care Plan in [facility] electronic health record for goal details.  Goals met    Therapy recommendation(s):    No further therapy is recommended. Defer to acute PT for activity tolerance.       03/18/24 1400   Appointment Info   Signing Clinician's Name / Credentials (OT) Brenda Bates, GOPIR/L       Present yes   Language [place] - Hmong   Living Environment   People in Home child(uvaldo), adult;grandchild(uvaldo)  (son, MEGAN, grandchildren)   Current Living Arrangements house   Self-Care   Equipment Currently Used at Home none   Activity/Exercise/Self-Care Comment ind for BADLs   Instrumental Activities of Daily Living (IADL)   IADL Comments family completes IADLs, pt doesn't drive   General Information   Onset of Illness/Injury or Date of Surgery 03/17/24   Referring Physician Gondal, Saad J, MD   Patient/Family Therapy Goal Statement (OT) go home   Additional Occupational Profile Info/Pertinent History of Current Problem presents with hematemesis, rectal bleeding. admit for GI bleed and anemia   Existing Precautions/Restrictions no known precautions/restrictions   Cognitive Status Examination   Affect/Mental Status (Cognitive) WFL   Follows Commands WFL   Pain Assessment   Patient Currently in Pain No   Range of Motion Comprehensive   Comment, General Range of Motion BUE WFL   Strength Comprehensive (MMT)   Comment, General Manual Muscle Testing (MMT) Assessment BUE WFL   Bed Mobility   Bed Mobility No deficits identified   Transfers   Transfers sit-stand transfer;toilet transfer   Sit-Stand Transfer   Sit-Stand [place] (Transfers) supervision   Toilet Transfer   Type (Toilet Transfer) sit-stand;stand-sit   [place] Level (Toilet Transfer) supervision   Balance   Balance Comments SBA without AD for mobility to/from  bathroom - mild fatigue following   Activities of Daily Living   BADL Assessment/Intervention lower body dressing;toileting   Lower Body Dressing Assessment/Training   [place] Level (Lower Body Dressing) supervision   Toileting   [place] Level (Toileting) supervision   Clinical Impression   Criteria for Skilled Therapeutic Interventions Met (OT) Yes, treatment indicated   OT Diagnosis dec functional mobility   OT Problem List-Impairments impacting ADL problems related to;activity tolerance impaired;mobility   Assessment of Occupational Performance 1-3 Performance Deficits   Identified Performance Deficits household mobility, community mobility   Planned Therapy Interventions (OT) progressive activity/exercise   Clinical Decision Making Complexity (OT) problem focused assessment/low complexity   Risk & Benefits of therapy have been explained evaluation/treatment results reviewed;participants included;patient;son   OT Total Evaluation Time   OT Eval, Low Complexity Minutes (19529) 15   OT Goals   Therapy Frequency (OT) One time eval and treatment   OT Predicted Duration/Target Date for Goal Attainment 03/18/24   OT Goals OT Goal 1   OT: Goal 1 Pt will complete 10 minutes of standing/mobility with SBA without cues for pacing to facilitate a return to IND during ADL routines.   Therapeutic Activities   Therapeutic Activity Minutes (57644) 10   Symptoms noted during/after treatment fatigue   Treatment Detail/Skilled Intervention Educ pt on pacing self according to symptoms. Facilitated doshi walk to progress activity tolerance needed for BADL routines. SBA for 200ft mobility in doshi following mobility to/from bathroom and toileting during eval.   OT Discharge Planning   OT Plan defer to PT   OT Discharge Recommendation (DC Rec) home with assist   OT Rationale for DC Rec lives with family who can assist PRN. near ADL baseline.   OT Brief overview of current status SBA

## 2024-03-18 NOTE — PLAN OF CARE
Problem: Fatigue  Goal: Improved Activity Tolerance  Outcome: Progressing     Problem: Anemia  Goal: Anemia Symptom Improvement  Outcome: Progressing     Problem: Gastrointestinal Bleeding  Goal: Optimal Coping with Acute Illness  Outcome: Progressing   Goal Outcome Evaluation:     Received patient at 0200. Patient is oriented x4, denies pain. SBP in the 170s to 150s range. Received a total of 2 units of blood. Tolerated blood transfusion. LR bolus and albumin given as well. No episodes of bleeding. Able to void in urinal, urine sample sent. GI consult in place. Patient is NPO. Iv fluids running continuous. Patient's son at bedside, helping to interpret Hmong. NSR on cardiac monitor.

## 2024-03-18 NOTE — MEDICATION SCRIBE - ADMISSION MEDICATION HISTORY
Medication Scribe Admission Medication History    Admission medication history is complete. The information provided in this note is only as accurate as the sources available at the time of the update.    Information Source(s): Family member via in-person    Pertinent Information: Patient's daughter provided updated PTA med list.    Changes made to PTA medication list:  Added: Amlodipine-Valsartan, Aspirin, Hydralazine, Isosorbide mononitrate, Tizanidine (per patient's daughter and fill history)  Deleted: Tylenol #3, Amlodipine, Cetirizine, Cyclobenzaprine, Ibuprofen, Levothyroxine (Per patient's daughter)  Changed: None    Allergies reviewed with patient and updates made in EHR: yes    Medication History Completed By: Edmundo Elder 3/17/2024 11:35 PM    PTA Med List   Medication Sig Last Dose    acetaminophen (TYLENOL) 500 MG tablet Take 325-650 mg by mouth every 6 hours as needed for mild pain Unknown at prn    amLODIPine-valsartan (EXFORGE)  MG tablet Take 1 tablet by mouth daily 3/17/2024 at am    ASPIRIN LOW DOSE 81 MG EC tablet Take 81 mg by mouth daily 3/17/2024 at am    ferrous sulfate (FE TABS) 325 (65 Fe) MG EC tablet Take 325 mg by mouth daily 3/17/2024 at am    hydrALAZINE (APRESOLINE) 100 MG tablet Take 100 mg by mouth 3 times daily 3/17/2024 at pm    isosorbide mononitrate (IMDUR) 30 MG 24 hr tablet Take 30 mg by mouth daily 3/17/2024 at am    pantoprazole (PROTONIX) 40 MG EC tablet Take 1 tablet (40 mg) by mouth 2 times daily 3/17/2024 at pm    rosuvastatin (CRESTOR) 40 MG tablet Take 40 mg by mouth at bedtime 3/16/2024 at hs    tiZANidine (ZANAFLEX) 2 MG tablet Take 2-4 mg by mouth every 6 hours as needed for muscle spasms Unknown at prn

## 2024-03-18 NOTE — CONSULTS
CONSULTING PHYSICIAN   Malcom Hanson MD     REASON FOR CONSULTATION   Hematemesis, rectal bleeding      CHIEF COMPLAINT   Allen Julian came to the hospital for evaluation of GIB      HISTORY OF PRESENT ILLNESS   Allen Julian is a 78 year old male with HTN, DM, hypothyrodism, CAD, angioedema admitted with rectal bleeding, hematemesis.   Patient recently in the hospital in 10/2023 with GIB and found to have gastric/duodenal ulcers, positive for H Pylori. Patient was discharged on pantoprazole but unclear if he ever took treatment for H Pylori    Patient returned to the ER with epidose of hematemesis and rectal bleeding. Initially the rectal bleeding was red and transitioned to black stool.     Denies NSAIDS. Reports adherence to PPI therapy     States he has never had a colonoscopy     Saw Cardiology and planned for AVR and bypass due to diffuse CAD on angiogram      PAST HISTORY   History reviewed. No pertinent past medical history.   Past Surgical History:   Procedure Laterality Date    ESOPHAGOSCOPY, GASTROSCOPY, DUODENOSCOPY (EGD), COMBINED N/A 10/7/2023    Procedure: ESOPHAGOGASTRODUODENOSCOPY;  Surgeon: Osiel Stinson MD;  Location: [facility] OR        Family History Social History   No family history on file. Social History     Tobacco Use    Smoking status: Never    Smokeless tobacco: Never   Substance Use Topics    Alcohol use: Never    Drug use: Never          MEDICATIONS & ALLERGIES   (Not in a hospital admission)       ALLERGIES   No Known Allergies      REVIEW OF SYSTEMS   A comprehensive review of systems was performed and was otherwise noncontributory.     OBJECTIVE   Vitals Blood pressure (!) 153/67, pulse 60, temperature 98.9  F (37.2  C), temperature source Oral, resp. rate 24, height 1.524 m (5'), weight 61.2 kg (135 lb), SpO2 94%.           Physical  Exam  GENERAL: alert and oriented, well nourished in no apparent distress   SKIN: warm and dry, no  rashes   HEENT: atraumatic, anicteric, moist mucous membranes, neck soft/supple    PULMONARY: normal resp effort, breath sounds clear to auscultation bilateral   CARDIOVASCULAR: positive for diastolic murmur   ABDOMEN: no tenderness, no distention, bowel sounds normal   MUSCULOSKELETAL: joints and gait normal   NEUROLOGICAL: appropriate mental status, grossly intact  DERM: no rash, no jaundice   PSYCHIATRIC: normal mood, affect and insight        LABORATORY    ELECTROLYTE PANEL   Recent Labs   Lab 03/18/24  0959 03/18/24  0901 03/18/24  0635 03/17/24  2257   NA  --   --  138 140   POTASSIUM  --  5.3 6.1* 5.2   CHLORIDE  --   --  112* 111*   CO2  --   --  19* 20*   GLC 87  --  91 165*   CR  --   --  1.75* 1.93*   BUN  --   --  51.0* 57.6*      HEMATOLOGY PANEL   Recent Labs   Lab 03/18/24  0635 03/17/24  2257   HGB 7.8* 8.1*   MCV 91 95   WBC 7.7 11.5*   * 172   INR  --  1.06      LIVER AND PANCREAS PANEL   Recent Labs   Lab 03/18/24  0635 03/17/24  2257   AST 18 25   ALT 12 17   ALKPHOS 39* 45   BILITOTAL 0.8 0.4     IMAGING STUDIES    EXAM: CTA ABDOMEN PELVIS WITH CONTRAST  LOCATION: [facility]  DATE: 3/18/2024     INDICATION: History of gib, presenting with bright red blood per rectum, emesis with blood.  COMPARISON: CTA abdomen/pelvis with contrast 10/06/2023  TECHNIQUE: CT angiogram abdomen pelvis during arterial phase of injection of IV contrast. 2D and 3D MIP reconstructions were performed by the CT technologist. Dose reduction techniques were used.  CONTRAST: isovue 370 75ml     FINDINGS:  ANGIOGRAM ABDOMEN/PELVIS: No acute GI bleeding. No aortic aneurysm or dissection. Conventional hepatic arterial anatomy. No significant stenosis of the celiac artery or superior mesenteric artery. Inferior mesenteric artery and renal arteries appear   patent without stenoses. Scattered atherosclerotic calcifications of the aortoiliac vessels. No AAA. Left common iliac artery aneurysm  measuring 2.2 cm (series 7 image 267), stable. Left internal iliac artery aneurysm measuring 1.8 cm (series 7 image   294), stable.     LOWER CHEST: Stable massive cardiomegaly. Right lower lobe calcified granuloma.     HEPATOBILIARY: Mild diffuse hepatic steatosis. Unremarkable gallbladder.     PANCREAS: Normal.     SPLEEN: Normal.     ADRENAL GLANDS: Normal.     KIDNEYS/BLADDER: Renal cysts which require no dedicated follow-up. No hydronephrosis. Diffuse thickening of the urinary bladder.     BOWEL: Diffuse colonic diverticulosis. No bowel obstruction.     LYMPH NODES: Normal.     PELVIC ORGANS: Prominent prostate.     MUSCULOSKELETAL: Small fat-containing inguinal hernias. Multilevel degenerative changes of the thoracic and lumbosacral spine. Scoliosis. No acute osseous abnormality.                                                                      IMPRESSION:  1.  No acute GI bleeding.  2.  Diffuse colonic diverticulosis, without definite evidence of diverticulitis.  3.  Mild diffuse hepatic steatosis.  4.  Stable left common and internal iliac artery aneurysms measuring 2.2 cm and 1.8 cm, respectively.  5.  Diffuse thickening of the urinary bladder. Cystitis is possible and correlation with urinalysis is recommended.    I have reviewed the current diagnostic and laboratory tests.           IMPRESSION   Allen Julian is a 78 year old male with hx of H Pylori PUD returning with GIB    GIB - suspect PUD bleeding. Reports adherence to PPI but uncertain if he was treated for H Pylori   EGD in OR given cardiac status  NPO   Continue PPI             Nic Naylor MD  Thank you for the opportunity to participate in the care of this patient.   Please feel free to call me with any questions or concerns.  Phone number [phone].

## 2024-03-18 NOTE — PROGRESS NOTES
03/18/24 1015   Appointment Info   Signing Clinician's Name / Credentials (PT) Berenice Go DPT   Living Environment   People in Home child(uvaldo), adult  (2 sons)   Current Living Arrangements house   Home Accessibility stairs to enter home   Number of Stairs, Main Entrance 2;3   Transportation Anticipated family or friend will provide   Self-Care   Usual Activity Tolerance good   Current Activity Tolerance moderate   Equipment Currently Used at Home none  (owns FWW and SEC)   Activity/Exercise/Self-Care Comment Independent with ADLs.   General Information   Onset of Illness/Injury or Date of Surgery 03/17/24   Referring Physician Gondal, Saad J, MD   Patient/Family Therapy Goals Statement (PT) none   Pertinent History of Current Problem (include personal factors and/or comorbidities that impact the POC) 78 year old male admitted on 3/17/2024. He  presented to the ER with complaint of hematemesis, rectal bleeding, [place], of note patient was admitted to [facility] in October 2023 for gastrointestinal hemorrhage and was found to be anemic, was transfused 2 units of PRBCs s/p which hemoglobin improved and patient was subsequently discharged, in the ED 2 units of packed red blood cells were ordered along with Protonix, GI was consulted who recommended close monitoring and possible EGD in a.m., CT scan of the abdomen was performed which showed no acute process, patient is going to be admitted for GI bleeding, CATALINO and blood loss anemia   Strength (Manual Muscle Testing)   Strength (Manual Muscle Testing) strength is WFL   Bed Mobility   Bed Mobility supine-sit;sit-supine   Supine-Sit [place] (Bed Mobility) supervision   Sit-Supine [place] (Bed Mobility) supervision   Assistive Device (Bed Mobility) bed rails   Transfers   Transfers sit-stand transfer   Sit-Stand Transfer   Sit-Stand [place] (Transfers) supervision   Assistive Device (Sit-Stand Transfers) walker, front-wheeled   Gait/Stairs  (Locomotion)   [place] Level (Gait) contact guard   Assistive Device (Gait) walker, front-wheeled   Distance in Feet (Gait) 5'   Pattern (Gait) step-through   Deviations/Abnormal Patterns (Gait) gait speed decreased   Clinical Impression   Criteria for Skilled Therapeutic Intervention Yes, treatment indicated   PT Diagnosis (PT) Impaired functional mobility   Influenced by the following impairments Fatigue   Functional limitations due to impairments Impaired gait, endurance   Clinical Presentation (PT Evaluation Complexity) stable   Clinical Presentation Rationale Presents as diagnosed   Clinical Decision Making (Complexity) low complexity   Planned Therapy Interventions (PT) bed mobility training;gait training;stair training;transfer training   Risk & Benefits of therapy have been explained patient   PT Total Evaluation Time   PT Eval, Low Complexity Minutes (91780) 10   Physical Therapy Goals   PT Frequency 3x/week   PT Predicted Duration/Target Date for Goal Attainment 03/25/24   PT Goals Bed Mobility;Transfers;Gait   PT: Bed Mobility Independent;Supine to/from sit   PT: Transfers Independent;Sit to/from stand;Bed to/from chair   PT: Gait Supervision/stand-by assist;100 feet   PT Discharge Planning   PT Plan progress transfers, amb with/out AD, stairs   PT Discharge Recommendation (DC Rec) home with assist   PT Rationale for DC Rec Pt appears close to baseline mobility. Has AD's at home as needed.   PT Brief overview of current status Pt amb 40' with CGA and FWW.   PT Equipment Needed at Discharge walker, rolling   Total Session Time   Total Session Time (sum of timed and untimed services) 10         Berenice Go, PT, DPT  3/18/2024

## 2024-03-18 NOTE — H&P
[facility]    History and Physical - Hospitalist Service       Date of Admission:  3/17/2024    Assessment & Plan    Assessment:  Allen Julian is a 78 year old male admitted on 3/17/2024. He  presented to the ER with complaint of hematemesis, rectal bleeding, Paler, of note patient was admitted to [facility] in October 2023 for gastrointestinal hemorrhage and was found to be anemic, was transfused 2 units of PRBCs s/p which hemoglobin improved and patient was subsequently discharged, in the ED 2 units of packed red blood cells were ordered along with Protonix, GI was consulted who recommended close monitoring and possible EGD in a.m., CT scan of the abdomen was performed which showed no acute process, patient is going to be admitted for GI bleeding, CATALINO and blood loss anemia.    Problem list and plan:  Hematemesis and per rectal bleed possibly secondary to upper GI bleeding  Blood loss anemia  Mild leukocytosis most likely reactive secondary to above  Presented with multiple episodes of hematemesis and per rectal bleeding and was becoming pale so was brought to the ER  Blood transfusion was ordered in the ED, along with Protonix  Will continue with IV Protonix twice daily  GI was consulted in the ED and recommended close monitoring of hemoglobin and possible endoscopy in a.m.  Continue to keep patient n.p.o. for now  Monitor vital signs closely  CT scan of the abdomen did not show any acute process or active bleeding  Monitor CBC, follow-up iron panel  Monitor fever and WBC curve  Holding aspirin for now, may restart once appropriate  Continue with p.o. ferrous sulfate    Diffuse thickening of the urinary bladder on imaging  CT scan of the abdomen showed diffuse thickening of the urinary bladder. 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 on imaging  CT scan of the abdomen showed stable left  common and internal iliac artery aneurysms measuring 2.2 cm and 1.8 cm  May need outpatient repeat imaging at the PCP office in a few months s/p discharge to follow-up    CATALINO on CKD stage III  Normal anion gap metabolic acidosis  Hypoalbuminemia  Lactic acidosis  Suspecting CATALINO most likely in setting of blood loss and prerenal  Baseline creatinine of around 1.5  Current creatinine 1.93  Gentle IV hydration  Blood transfusion  Monitor renal function closely  Monitor for worsening acidosis  Albumin repleted  Trend lactate levels    Mild hyperglycemia most likely reactive  Hemoglobin A1c 5.6  Monitor blood sugar levels intermittently    History of hypertension  Monitor vital signs as per protocol  IV hydralazine as needed for elevated blood pressure  Holding blood pressure medication because of GI bleeding  Restart blood pressure medication once appropriate    History of hyperlipidemia  Holding aspirin for now as above  Continue with statin    Physical deconditioning/weakness  PT and OT evaluation  Fall precautions    DVT PPx  Intermittent pneumatic compression    CODE STATUS  Full code as per discussion with patient and family at bedside          Diet: NPO for Medical/Clinical Reasons Except for: No Exceptions    DVT Prophylaxis: Pneumatic Compression Devices  Jimenez Catheter: Not present  Lines: None     Cardiac Monitoring: ACTIVE order. Indication: CATALINO  Code Status: Full Code    Mental status: Patient was alert awake and oriented x 3, Monk speaking, family was interpreting, most of the history was obtained from family at bedside, further history was obtained from chart review and discussion with the ER physician.  Clinically Significant Risk Factors Present on Admission              # Hypoalbuminemia: Lowest albumin = 3 g/dL at 3/17/2024 10:57 PM, will monitor as appropriate   # Drug Induced Platelet Defect: home medication list includes an antiplatelet medication   # Hypertension: Noted on problem list      #  Overweight: Estimated body mass index is 26.37 kg/m  as calculated from the following:    Height as of this encounter: 1.524 m (5').    Weight as of this encounter: 61.2 kg (135 lb).              Disposition Plan      Expected Discharge Date: 03/20/2024                  Saad J. Gondal, MD  Hospitalist Service  [facility]  Securely message with [employer] (more info)  Text page via [facility] Paging/Directory     ______________________________________________________________________    Chief Complaint   Hematemesis, rectal bleeding, Paler    History is obtained from the patients family at bedside    History of Present Illness   Allen Julian is a 78 year old male with a past medical history documented below presented to the ER with complaint of hematemesis, rectal bleeding, Paler, of note patient was admitted to [facility] in October 2023 for gastrointestinal hemorrhage and was found to be anemic, was transfused 2 units of PRBCs s/p which hemoglobin improved and patient was subsequently discharged, today at around 9 PM patient felt nauseous and started having bloody bowel movements every 15 to 30 minutes and also had a few episodes of hematemesis as per family, patient started feeling short of breath and generally weak, but no abdominal pain, patient looked pale and family got concerned and decided to bring him to the ER, patient is not on any blood thinning medication other than aspirin and has been compliant with pantoprazole, denied any other complaints, in the ER vitals were fairly within normal limits other than mildly elevated blood pressure, labs were significant for CATALINO with a creatinine of 1.93 with a baseline of 1.5, normal anion gap metabolic acidosis, hypoalbuminemia, mild hyperglycemia most likely reactive, lactic acidosis, leukocytosis most likely reactive and normocytic anemia, in the ED 2 units of packed red blood cells were ordered along with Protonix, GI was consulted who  recommended close monitoring and possible EGD in a.m., CT scan of the abdomen was performed which showed no acute process, patient is going to be admitted for GI bleeding, CATALINO and blood loss anemia.      Past Medical History    History reviewed. No pertinent past medical history.    Past Surgical History   Past Surgical History:   Procedure Laterality Date    ESOPHAGOSCOPY, GASTROSCOPY, DUODENOSCOPY (EGD), COMBINED N/A 10/7/2023    Procedure: ESOPHAGOGASTRODUODENOSCOPY;  Surgeon: Osiel Stinson MD;  Location: [facility] OR       Prior to Admission Medications   Prior to Admission Medications   Prescriptions Last Dose Informant Patient Reported? Taking?   ASPIRIN LOW DOSE 81 MG EC tablet 3/17/2024 at am Daughter Yes Yes   Sig: Take 81 mg by mouth daily   acetaminophen (TYLENOL) 500 MG tablet Unknown at prn Other, Daughter Yes Yes   Sig: Take 325-650 mg by mouth every 6 hours as needed for mild pain   amLODIPine-valsartan (EXFORGE)  MG tablet 3/17/2024 at am Daughter Yes Yes   Sig: Take 1 tablet by mouth daily   ferrous sulfate (FE TABS) 325 (65 Fe) MG EC tablet 3/17/2024 at am Other, Daughter Yes Yes   Sig: Take 325 mg by mouth daily   hydrALAZINE (APRESOLINE) 100 MG tablet 3/17/2024 at pm Daughter Yes Yes   Sig: Take 100 mg by mouth 3 times daily   isosorbide mononitrate (IMDUR) 30 MG 24 hr tablet 3/17/2024 at am Daughter Yes Yes   Sig: Take 30 mg by mouth daily   pantoprazole (PROTONIX) 40 MG EC tablet 3/17/2024 at pm Daughter No Yes   Sig: Take 1 tablet (40 mg) by mouth 2 times daily   rosuvastatin (CRESTOR) 40 MG tablet 3/16/2024 at hs Daughter Yes Yes   Sig: Take 40 mg by mouth at bedtime   tiZANidine (ZANAFLEX) 2 MG tablet Unknown at prn Daughter Yes Yes   Sig: Take 2-4 mg by mouth every 6 hours as needed for muscle spasms      Facility-Administered Medications: None           Physical Exam   Vital Signs: Temp: 97.9  F (36.6  C) Temp src: Oral BP: 138/63 Pulse: 62   Resp: 22 SpO2: 98 % O2  Device: None (Room air)    Weight: 135 lbs 0 oz        Medical Decision Making             Data

## 2024-03-18 NOTE — UTILIZATION REVIEW
Admission Status; Secondary Review Determination   Under the authority of the Utilization Management Committee, the utilization review process indicated a secondary review on Allen Julian. The review outcome is based on review of the medical records, discussions with staff, and applying clinical experience noted on the date of the review.   (x) Inpatient Status Appropriate - This patient's medical care is consistent with medical management for inpatient care and reasonable inpatient medical practice.     RATIONALE FOR DETERMINATION   Allen Julian is a 78 yr old male with DM, HTN, CAD, hypothyroidism, CAD, angioedema who presented with rectal bleeding and hematemesis.  Prior GI bleed 10/23 with gastric/duodenal ulcers in setting of h pylori.  Bleeding per rectum was initially bright red now black.  No NSAID.  Cardiology is planning AVR and bypass due to diffuse disease.  HGB 8.1 last evening and has received 2 unit pRBC with HGB 7.8 this AM.  Also with mild increase in Cr with big rise in BUN indicative of concern for upper GI bleed.  Currently NPO with IVF and IV PPI with plan for EGD in OR given high complexity related to cardiac conditions.  Not stable for discharge at this time.     At the time of admission with the information available to the attending physician more than 2 nights Hospital complex care was anticipated, based on patient risk of adverse outcome if treated as outpatient and complex care required. Inpatient admission is appropriate based on the Medicare guidelines.   The information on this document is developed by the utilization review team in order for the business office to ensure compliance. This only denotes the appropriateness of proper admission status and does not reflect the quality of care rendered.   The definitions of Inpatient Status and Observation Status used in making the determination above are those provided in the CMS Coverage Manual, Chapter 1 and Chapter 6, section 70.4.    Sincerely,   Laina Grewal MD  Utilization Review  Physician Advisor  [facility]

## 2024-03-18 NOTE — PLAN OF CARE
Problem: Fatigue  Goal: Improved Activity Tolerance  Intervention: Promote Improved Energy  Recent Flowsheet Documentation  Taken 3/18/2024 0800 by Trista Preston RN  Activity Management: activity adjusted per tolerance     Problem: Adult Inpatient Plan of Care  Goal: Absence of Hospital-Acquired Illness or Injury  Intervention: Identify and Manage Fall Risk  Recent Flowsheet Documentation  Taken 3/18/2024 0800 by Trista Preston RN  Safety Promotion/Fall Prevention: activity supervised     Problem: Adult Inpatient Plan of Care  Goal: Absence of Hospital-Acquired Illness or Injury  Intervention: Prevent Skin Injury  Recent Flowsheet Documentation  Taken 3/18/2024 0800 by Trista Preston RN  Body Position: position changed independently   Goal Outcome Evaluation:       Pt is progressing with these goals.  Pt denies any pain, N/V/D.  Potassium rechecked for 5.3.  Md. Hanson aware of labs.  Pt tolerated PT with short walk in doshi and assist of 1.  Pt denies any dizziness.  Pt is awaiting egd this afternoon.  Will continue to monitor status.

## 2024-03-18 NOTE — PRE-PROCEDURE
GENERAL PRE-PROCEDURE:   Procedure:  Esophagogastroduodenoscopy  Date/Time:  3/18/2024 4:15 PM    Verbal consent obtained?: Yes    Written consent obtained?: Yes    Risks and benefits: Risks, benefits and alternatives were discussed    Consent given by:  Patient  Patient states understanding of procedure being performed: Yes    Patient's understanding of procedure matches consent: Yes    Procedure consent matches procedure scheduled: Yes    Expected level of sedation:  Moderate  Appropriately NPO:  Yes  ASA Class:  3  Mallampati  :  Grade 3- soft palate visible, posterior pharyngeal wall not visible  Lungs:  Lungs clear with good breath sounds bilaterally  Heart:  Normal heart sounds and rate and diastolic murmur  History & Physical reviewed:  History and physical reviewed and no updates needed  Statement of review:  I have reviewed the lab findings, diagnostic data, medications, and the plan for sedation

## 2024-03-19 ENCOUNTER — APPOINTMENT (OUTPATIENT)
Dept: PHYSICAL THERAPY | Facility: HOSPITAL | Age: 79
DRG: 378 | End: 2024-03-19
Payer: COMMERCIAL

## 2024-03-19 LAB
ALBUMIN MFR UR ELPH: 6.5 MG/DL
ANION GAP SERPL CALCULATED.3IONS-SCNC: 5 MMOL/L (ref 7–15)
BUN SERPL-MCNC: 45 MG/DL (ref 8–23)
CALCIUM SERPL-MCNC: 8.3 MG/DL (ref 8.8–10.2)
CHLORIDE SERPL-SCNC: 113 MMOL/L (ref 98–107)
CREAT SERPL-MCNC: 1.93 MG/DL (ref 0.67–1.17)
CREAT UR-MCNC: 39.2 MG/DL
DEPRECATED HCO3 PLAS-SCNC: 21 MMOL/L (ref 22–29)
EGFRCR SERPLBLD CKD-EPI 2021: 35 ML/MIN/1.73M2
ERYTHROCYTE [DISTWIDTH] IN BLOOD BY AUTOMATED COUNT: 15.2 % (ref 10–15)
GLUCOSE BLDC GLUCOMTR-MCNC: 120 MG/DL (ref 70–99)
GLUCOSE BLDC GLUCOMTR-MCNC: 129 MG/DL (ref 70–99)
GLUCOSE SERPL-MCNC: 143 MG/DL (ref 70–99)
HCT VFR BLD AUTO: 24.5 % (ref 40–53)
HGB BLD-MCNC: 7.9 G/DL (ref 13.3–17.7)
MAGNESIUM SERPL-MCNC: 2.3 MG/DL (ref 1.7–2.3)
MCH RBC QN AUTO: 29.4 PG (ref 26.5–33)
MCHC RBC AUTO-ENTMCNC: 32.2 G/DL (ref 31.5–36.5)
MCV RBC AUTO: 91 FL (ref 78–100)
PHOSPHATE SERPL-MCNC: 3.3 MG/DL (ref 2.5–4.5)
PLATELET # BLD AUTO: 139 10E3/UL (ref 150–450)
POTASSIUM SERPL-SCNC: 5.4 MMOL/L (ref 3.4–5.3)
PROT/CREAT 24H UR: 0.17 MG/MG CR (ref 0–0.2)
RBC # BLD AUTO: 2.69 10E6/UL (ref 4.4–5.9)
SODIUM SERPL-SCNC: 139 MMOL/L (ref 135–145)
WBC # BLD AUTO: 5.5 10E3/UL (ref 4–11)

## 2024-03-19 PROCEDURE — 250N000013 HC RX MED GY IP 250 OP 250 PS 637: Performed by: PHYSICIAN ASSISTANT

## 2024-03-19 PROCEDURE — 80048 BASIC METABOLIC PNL TOTAL CA: CPT | Performed by: INTERNAL MEDICINE

## 2024-03-19 PROCEDURE — 84100 ASSAY OF PHOSPHORUS: CPT | Performed by: STUDENT IN AN ORGANIZED HEALTH CARE EDUCATION/TRAINING PROGRAM

## 2024-03-19 PROCEDURE — 99222 1ST HOSP IP/OBS MODERATE 55: CPT | Performed by: PHYSICIAN ASSISTANT

## 2024-03-19 PROCEDURE — 250N000013 HC RX MED GY IP 250 OP 250 PS 637: Performed by: INTERNAL MEDICINE

## 2024-03-19 PROCEDURE — 97116 GAIT TRAINING THERAPY: CPT | Mod: GP

## 2024-03-19 PROCEDURE — 83735 ASSAY OF MAGNESIUM: CPT | Performed by: STUDENT IN AN ORGANIZED HEALTH CARE EDUCATION/TRAINING PROGRAM

## 2024-03-19 PROCEDURE — 120N000001 HC R&B MED SURG/OB

## 2024-03-19 PROCEDURE — 250N000011 HC RX IP 250 OP 636: Performed by: INTERNAL MEDICINE

## 2024-03-19 PROCEDURE — 99232 SBSQ HOSP IP/OBS MODERATE 35: CPT | Performed by: INTERNAL MEDICINE

## 2024-03-19 PROCEDURE — 85027 COMPLETE CBC AUTOMATED: CPT | Performed by: INTERNAL MEDICINE

## 2024-03-19 PROCEDURE — 250N000013 HC RX MED GY IP 250 OP 250 PS 637: Performed by: STUDENT IN AN ORGANIZED HEALTH CARE EDUCATION/TRAINING PROGRAM

## 2024-03-19 PROCEDURE — 36415 COLL VENOUS BLD VENIPUNCTURE: CPT | Performed by: INTERNAL MEDICINE

## 2024-03-19 PROCEDURE — C9113 INJ PANTOPRAZOLE SODIUM, VIA: HCPCS | Performed by: INTERNAL MEDICINE

## 2024-03-19 RX ORDER — HYDRALAZINE HYDROCHLORIDE 25 MG/1
25 TABLET, FILM COATED ORAL 3 TIMES DAILY
Status: DISCONTINUED | OUTPATIENT
Start: 2024-03-19 | End: 2024-03-23

## 2024-03-19 RX ORDER — ISOSORBIDE MONONITRATE 30 MG/1
30 TABLET, EXTENDED RELEASE ORAL DAILY
Status: DISCONTINUED | OUTPATIENT
Start: 2024-03-19 | End: 2024-03-23 | Stop reason: HOSPADM

## 2024-03-19 RX ORDER — SODIUM BICARBONATE 650 MG/1
650 TABLET ORAL 2 TIMES DAILY
Status: DISCONTINUED | OUTPATIENT
Start: 2024-03-19 | End: 2024-03-23 | Stop reason: HOSPADM

## 2024-03-19 RX ADMIN — BISMUTH SUBSALICYLATE 262 MG: 262 TABLET, CHEWABLE ORAL at 22:31

## 2024-03-19 RX ADMIN — SODIUM BICARBONATE 650 MG TABLET 650 MG: at 22:29

## 2024-03-19 RX ADMIN — PANTOPRAZOLE SODIUM 40 MG: 40 INJECTION, POWDER, FOR SOLUTION INTRAVENOUS at 09:52

## 2024-03-19 RX ADMIN — SODIUM BICARBONATE 650 MG TABLET 650 MG: at 13:01

## 2024-03-19 RX ADMIN — METRONIDAZOLE 500 MG: 500 TABLET ORAL at 09:54

## 2024-03-19 RX ADMIN — DOCUSATE SODIUM 50 MG AND SENNOSIDES 8.6 MG 1 TABLET: 8.6; 5 TABLET, FILM COATED ORAL at 16:46

## 2024-03-19 RX ADMIN — PANTOPRAZOLE SODIUM 40 MG: 40 INJECTION, POWDER, FOR SOLUTION INTRAVENOUS at 20:46

## 2024-03-19 RX ADMIN — METRONIDAZOLE 500 MG: 500 TABLET ORAL at 13:01

## 2024-03-19 RX ADMIN — METRONIDAZOLE 500 MG: 500 TABLET ORAL at 20:45

## 2024-03-19 RX ADMIN — ISOSORBIDE MONONITRATE 30 MG: 30 TABLET, EXTENDED RELEASE ORAL at 13:01

## 2024-03-19 RX ADMIN — ROSUVASTATIN CALCIUM 40 MG: 40 TABLET, COATED ORAL at 22:29

## 2024-03-19 RX ADMIN — BISMUTH SUBSALICYLATE 262 MG: 262 TABLET, CHEWABLE ORAL at 16:30

## 2024-03-19 RX ADMIN — HYDRALAZINE HYDROCHLORIDE 25 MG: 25 TABLET, FILM COATED ORAL at 22:31

## 2024-03-19 RX ADMIN — DOXYCYCLINE HYCLATE 100 MG: 100 CAPSULE ORAL at 09:54

## 2024-03-19 RX ADMIN — DOXYCYCLINE HYCLATE 100 MG: 100 CAPSULE ORAL at 20:44

## 2024-03-19 RX ADMIN — HYDRALAZINE HYDROCHLORIDE 25 MG: 25 TABLET, FILM COATED ORAL at 13:04

## 2024-03-19 RX ADMIN — BISMUTH SUBSALICYLATE 262 MG: 262 TABLET, CHEWABLE ORAL at 13:01

## 2024-03-19 RX ADMIN — BISMUTH SUBSALICYLATE 262 MG: 262 TABLET, CHEWABLE ORAL at 07:02

## 2024-03-19 ASSESSMENT — ACTIVITIES OF DAILY LIVING (ADL)
ADLS_ACUITY_SCORE: 28
ADLS_ACUITY_SCORE: 29
ADLS_ACUITY_SCORE: 28
ADLS_ACUITY_SCORE: 28
ADLS_ACUITY_SCORE: 29
ADLS_ACUITY_SCORE: 28
ADLS_ACUITY_SCORE: 29
ADLS_ACUITY_SCORE: 28
ADLS_ACUITY_SCORE: 29
ADLS_ACUITY_SCORE: 28
ADLS_ACUITY_SCORE: 28
ADLS_ACUITY_SCORE: 29

## 2024-03-19 NOTE — PLAN OF CARE
Alert and oriented. Able to make needs known. Hmong speaking, family at bedside interpreting. Sore throat and occasional cough pt. Attributes to endoscopy. Aware of length of stay but culturally not tracking calendar. Mental state is intact and at baseline according to family. No forgetfulness. Ate a good meal. SBP in 130's scheduled bp meds given as parameters are to hold if sbp under 115.

## 2024-03-19 NOTE — PROGRESS NOTES
[facility]    Medicine Progress Note - Hospitalist Service    Date of Admission:  3/17/2024    Assessment & Plan   Allen Julian is a 78 year old male with past medical history of HTN, hypothyroidism, CAD, GI bleed due to gastric/duodenal ulcer requiring hospitalization on 10/2023, also required blood transfusion at that time who presented to ED for evaluation of hematemesis, rectal bleeding, reported initially rectal bleeding was red then transition to black stool.  Patient admitted on 3/17/2024 for further management.      Acute GI bleeding, ABLA; suspect due to PUD  History of H. pylori PUD;  On admission, CTA abdomen and pelvis negative for acute GI bleeding.  --On 3/18, EGD reported nonbleeding gastric ulcer with no stigmata of bleeding, also nonbleeding duodenal ulcer with visible vessel treated with bipolar cautery  -- On 3/19, personally discussed with gastroenterologist, recommended twice daily PPI and H. pylori treatment.  Plan for repeat EGD in 2 months  -- Hemoglobin around 7-8 range, trend.  Transfuse if hemoglobin less than 7 or hemodynamically unstable    Acute kidney injury on CKD stage III;  Metabolic acidosis;  Hyperkalemia;  -- Patient did received IV contrast during this hospitalization  -- Creatinine trending up, nephrology consulted, appreciated input  -- Hyperkalemia fluctuating, nephrology initiated patient on sodium bicarb  -- Labs in a.m.    CT imaging reported diffuse thickening of the urinary bladder;  -- No hydronephrosis, imaging reported prominent prostate.    --No urinary complaints and UA unremarkable  -- May consider Flomax as likely BPH    Essential hypertension; fairly controlled  --Blood pressure medications held on admission due to GI bleed, resume gradually with holding parameters.      History of hyperlipidemia  Holding aspirin for now as above  Continue with statin     Physical deconditioning/weakness  PT eval  Fall precautions             Diet: Regular  Diet Adult    DVT Prophylaxis: Pneumatic Compression Devices and Ambulate every shift  Jimenez Catheter: Not present  Lines: None     Cardiac Monitoring: None  Code Status: Full Code      Clinically Significant Risk Factors        # Hyperkalemia: Highest K = 6.1 mmol/L in last 2 days, will monitor as appropriate       # Hypoalbuminemia: Lowest albumin = 3 g/dL at 3/17/2024 10:57 PM, will monitor as appropriate   # Thrombocytopenia: Lowest platelets = 118 in last 2 days, will monitor for bleeding   # Hypertension: Noted on problem list        # Overweight: Estimated body mass index is 26.37 kg/m  as calculated from the following:    Height as of this encounter: 1.524 m (5').    Weight as of this encounter: 61.2 kg (135 lb)., PRESENT ON ADMISSION            Disposition Plan     Expected Discharge Date: 03/20/2024      Destination: home with family              Ace Dill MD  Hospitalist Service  [facility]  Securely message with [employer] (more info)  Text page via [email or web address] Paging/Directory   ______________________________________________________________________    Interval History   Patient is new to me.  Patient seen and examined.  Notes, labs, imaging report personally reviewed.  Patient reported feeling better.  Denied short of breath or chest pain or dizziness.  Denied abdominal pain, nausea.  Has not had bowel movement.  Denied fever or chills.  Denied urinary symptoms.  Discussed with nursing staff.  Discussed with gastroenterologist.  Patient's granddaughter at bedside help with interpretation and detailed plan of care discussed.    Physical Exam   Vital Signs: Temp: 97.5  F (36.4  C) Temp src: Oral BP: 117/58 Pulse: 69   Resp: 20 SpO2: 93 % O2 Device: None (Room air) Oxygen Delivery: 8 LPM  Weight: 135 lbs 0 oz      General: Not in obvious distress.  HEENT: Normocephalic, supple neck  Chest: Clear to auscultation bilateral anteriorly, no wheezing  Heart: S1S2 normal, regular  Abdomen: Soft.  NT, ND. Bowel sounds- active.  Extremities: No legs swelling  Neuro: alert and awake, grossly non-focal        Medical Decision Making             Data     I have personally reviewed the following data over the past 24 hrs:    5.5  \   7.9 (L)   / 139 (L)     139 113 (H) 45.0 (H) /  143 (H)   5.4 (H) 21 (L) 1.93 (H) \     Ferritin:  N/A % Retic:  N/A LDH:  N/A       Imaging results reviewed over the past 24 hrs:   No results found for this or any previous visit (from the past 24 hour(s)).

## 2024-03-19 NOTE — PLAN OF CARE
Problem: Anemia  Goal: Anemia Symptom Improvement  Outcome: Progressing  Intervention: Monitor and Manage Anemia  Recent Flowsheet Documentation  Taken 3/19/2024 0445 by Luther Sousa RN  Safety Promotion/Fall Prevention:   activity supervised   assistive device/personal items within reach   clutter free environment maintained   nonskid shoes/slippers when out of bed   safety round/check completed  Taken 3/19/2024 0000 by Luther Sousa RN  Safety Promotion/Fall Prevention:   activity supervised   assistive device/personal items within reach   clutter free environment maintained   nonskid shoes/slippers when out of bed   safety round/check completed     Problem: Gastrointestinal Bleeding  Goal: Optimal Coping with Acute Illness  Outcome: Progressing     Problem: Adult Inpatient Plan of Care  Goal: Absence of Hospital-Acquired Illness or Injury  Outcome: Progressing  Intervention: Identify and Manage Fall Risk  Recent Flowsheet Documentation  Taken 3/19/2024 0445 by Luther Sousa RN  Safety Promotion/Fall Prevention:   activity supervised   assistive device/personal items within reach   clutter free environment maintained   nonskid shoes/slippers when out of bed   safety round/check completed  Taken 3/19/2024 0000 by Luther Sousa RN  Safety Promotion/Fall Prevention:   activity supervised   assistive device/personal items within reach   clutter free environment maintained   nonskid shoes/slippers when out of bed   safety round/check completed  Intervention: Prevent Skin Injury  Recent Flowsheet Documentation  Taken 3/19/2024 0500 by Luther Sousa RN  Body Position: position changed independently  Taken 3/19/2024 0445 by Luther Sousa RN  Body Position: position changed independently  Taken 3/19/2024 0100 by Luther Sousa RN  Body Position: position changed independently  Taken 3/19/2024 0000 by Luther Sousa RN  Body Position: position changed independently  Intervention: Prevent Infection  Recent  Flowsheet Documentation  Taken 3/19/2024 0133 by Luther Sousa, RN  Infection Prevention: hand hygiene promoted   Goal Outcome Evaluation:       Pt alert, Hmong speaking, son at bedside to translate. Denies pain, BG stable,  Hgb 7.8 recheck q8hrs. No bleeding noted over night. /58 (BP Location: Right arm)   Pulse 66   Temp 97.7  F (36.5  C) (Oral)   Resp 20   Ht 1.524 m (5')   Wt 61.2 kg (135 lb)   SpO2 98%   BMI 26.37 kg/m

## 2024-03-19 NOTE — ANESTHESIA POSTPROCEDURE EVALUATION
Patient: Allen N Pha    Procedure: Procedure(s):  ESOPHAGOGASTRODUODENOSCOPY WITH BiCAP AND BIOPSY       Anesthesia Type:  General    Note:  Disposition: Inpatient   Postop Pain Control: Uneventful            Sign Out: Well controlled pain   PONV: No   Neuro/Psych: Uneventful            Sign Out: Acceptable/Baseline neuro status   Airway/Respiratory: Uneventful            Sign Out: Acceptable/Baseline resp. status   CV/Hemodynamics: Uneventful            Sign Out: Acceptable CV status; No obvious hypovolemia; No obvious fluid overload   Other NRE:    DID A NON-ROUTINE EVENT OCCUR?        Last vitals:  Vitals Value Taken Time   /66 03/18/24 1801   Temp 36.7  C (98  F) 03/18/24 1800   Pulse 56 03/18/24 1804   Resp 38 03/18/24 1804   SpO2 93 % 03/18/24 1804   Vitals shown include unfiled device data.    Electronically Signed By: Graeme Logan MD  March 18, 2024  7:30 PM

## 2024-03-19 NOTE — PLAN OF CARE
Problem: Anemia  Goal: Anemia Symptom Improvement  Outcome: Not Progressing  Intervention: Monitor and Manage Anemia  Recent Flowsheet Documentation  Taken 3/18/2024 2100 by Jessica Erazo RN  Safety Promotion/Fall Prevention:   activity supervised   assistive device/personal items within reach   clutter free environment maintained   nonskid shoes/slippers when out of bed   safety round/check completed   Goal Outcome Evaluation:         No bleeding noted. Hgb 7.8 at 2200. Recheck Hgb every 8 hours. Hmong speaking. Son in room overnight. /70, prn hydralyzine for SBP> 180. A1 for transfers.

## 2024-03-19 NOTE — CONSULTS
RENAL CONSULT NOTE    REQUESTING PHYSICIAN: Dr. LUIGI Bello    REASON FOR CONSULT: Patient with acute kidney injury, chronic kidney disease    ASSESSMENT/PLAN:  Acute kidney injury, chronic kidney disease - With baseline creatinine of 1.5-1.8 range, past urinalyses have shown bland urine sediment. He presented with creatinine mildly increased from this. Imaging was negative for obstruction. He received 75ml iodinated contrast on 3/17, may have some component of ROSANGELA. Recommend maintaining hemodynamics as much as able and avoid further nephrotoxins. Will obtain pro/cr ratio, paraprotein studies to complete typical CKD evaluation. If negative, suspect hypertensive nephrosclerosis as likely etiology for his CKD    Hematemesis, blood loss anemia - Presented with multiple episodes of hematemesis and rectal bleeding, s/p PRBCs and on protonix. GI following, s/p EGD on 3/17 which showed non-bleeding gastric ulcers and non-bleeding duodenal ulcer, visible vessel was treated with cautery. Treating H. Pylori and on-going PPI    Hyperkalemia - With chronic kidney disease, metabolic acidosis and suspected GI bleed.     Metabolic acidosis - In the setting of chronic kidney disease. Can start sodium bicarbonate    Bladder thickening - Noted on CT, urinalysis negative    Hypertension - As outpatient on amlodipine, valsartan, hydralazine, isosorbide    Hyperlipidemia - On statin        HPI:   The patient is a 78 year old man with a past medical history significant for hypertension, hyperlipidemia, chronic kidney disease who presented to the emergency department with complaint of hematemesis and rectal bleeding. Prior to admission he had developed nausea and had multiple bloody bowel movements as well as a few episodes of hematemesis. He then developed shortness of breath with weakness and his family decided to bring him to the emergency department. In the ED he received 2 units of packed red blood cells and protonix. GI was  consulted and performed EGD which showed non-bleeding gastric ulcers and non-bleeding duodenal ulcer. Recommendation was for treatment of H. Pylori and on-going Protonix. He is seen with his granddaughter at bedside. She notes that he's tired this morning and not very interested in talking. His appetite is okay. He has not had any nausea or vomiting. He is not short of breath. He denies pain.     REVIEW OF SYSTEMS:  Comprehensive review of systems negative except as noted in HPI      History reviewed. No pertinent past medical history.    No current facility-administered medications on file prior to encounter.  acetaminophen (TYLENOL) 500 MG tablet, Take 325-650 mg by mouth every 6 hours as needed for mild pain  amLODIPine-valsartan (EXFORGE)  MG tablet, Take 1 tablet by mouth daily  ASPIRIN LOW DOSE 81 MG EC tablet, Take 81 mg by mouth daily  ferrous sulfate (FE TABS) 325 (65 Fe) MG EC tablet, Take 325 mg by mouth daily  hydrALAZINE (APRESOLINE) 100 MG tablet, Take 100 mg by mouth 3 times daily  isosorbide mononitrate (IMDUR) 30 MG 24 hr tablet, Take 30 mg by mouth daily  pantoprazole (PROTONIX) 40 MG EC tablet, Take 1 tablet (40 mg) by mouth 2 times daily  rosuvastatin (CRESTOR) 40 MG tablet, Take 40 mg by mouth at bedtime  tiZANidine (ZANAFLEX) 2 MG tablet, Take 2-4 mg by mouth every 6 hours as needed for muscle spasms        No current outpatient medications on file.      ALLERGIES/SENSITIVITIES:  No Known Allergies  Social History     Tobacco Use    Smoking status: Never    Smokeless tobacco: Never   Substance Use Topics    Alcohol use: Never    Drug use: Never             PHYSICAL EXAM:  Physical Exam   Temp: 97.5  F (36.4  C) Temp src: Oral BP: 117/58 Pulse: 69   Resp: 20 SpO2: 93 % O2 Device: None (Room air) Oxygen Delivery: 8 LPM  Vitals:    03/17/24 2254   Weight: 61.2 kg (135 lb)     Vital Signs with Ranges  Temp:  [97.5  F (36.4  C)-98.6  F (37  C)] 97.5  F (36.4  C)  Pulse:  [55-72] 69  Resp:   [20-41] 20  BP: (117-173)/(54-71) 117/58  SpO2:  [93 %-100 %] 93 %  I/O last 3 completed shifts:  In: -   Out: 1100 [Urine:1100]      Patient Vitals for the past 72 hrs:   Weight   03/17/24 2254 61.2 kg (135 lb)       General - Awake, tired appearing, resting in bed comfortably  HEENT - Normocephalic, atraumatic  Neck - Supple. No JVD  Respiratory - Clear to auscultation bilaterally, no crackles or wheezes  Cardiovascular - Normal S1S2, no rub  Abdomen - Soft, bowel sounds present, non-tender to palpation  Extremities - Well perfused, trace lower extremity edema bilaterally  Integumentary - Warm, dry, no rash  Neurologic - Grossly intact; no focal deficits noted    Laboratory:     Recent Labs   Lab 03/19/24  0625 03/18/24 2129 03/18/24  1551 03/18/24  0635 03/17/24  2257   WBC 5.5  --   --  7.7 11.5*   RBC 2.69*  --   --  2.66* 2.66*   HGB 7.9* 7.8* 8.0* 7.8* 8.1*   HCT 24.5*  --   --  24.1* 25.3*   *  --   --  118* 172       Basic Metabolic Panel:  Recent Labs   Lab 03/19/24  0625 03/18/24  2106 03/18/24  1633 03/18/24  0959 03/18/24  0901 03/18/24  0635 03/17/24  2257     --   --   --   --  138 140   POTASSIUM 5.4*  --   --   --  5.3 6.1* 5.2   CHLORIDE 113*  --   --   --   --  112* 111*   CO2 21*  --   --   --   --  19* 20*   BUN 45.0*  --   --   --   --  51.0* 57.6*   CR 1.93*  --   --   --   --  1.75* 1.93*   * 156* 96 87  --  91 165*   SAVI 8.3*  --   --   --   --  7.8* 8.0*       INR  Recent Labs   Lab 03/17/24  2257   INR 1.06       Recent Labs   Lab Test 03/19/24  0625 03/18/24  0901 03/18/24  0635   POTASSIUM 5.4* 5.3 6.1*   CHLORIDE 113*  --  112*   BUN 45.0*  --  51.0*      Recent Labs   Lab Test 03/18/24  0635 03/18/24  0429 03/17/24  2257 10/08/23  0913 10/06/23  0252   ALBUMIN 3.4*  --  3.0*   < >  --    BILITOTAL 0.8  --  0.4   < >  --    ALT 12  --  17   < >  --    AST 18  --  25   < >  --    PROTEIN  --  Negative  --   --  Negative    < > = values in this interval not displayed.        Personally reviewed today's laboratory studies            Thank you for involving us in the care of this patient. We will continue to follow along with you.      Maryann Camara PA-C  [facility]  [phone]

## 2024-03-19 NOTE — PLAN OF CARE
Physical Therapy Discharge Summary    Reason for therapy discharge:    All goals and outcomes met, no further needs identified.    Progress towards therapy goal(s). See goals on Care Plan in [facility] electronic health record for goal details.  Goals met    Therapy recommendation(s):    No further therapy is recommended.      Berenice Go, PT, DPT  3/19/2024

## 2024-03-19 NOTE — PROGRESS NOTES
GASTROENTEROLOGY PROGRESS NOTE     SUBJECTIVE   Hgb stable, no complaints, no further GI bleeding.  Eating without difficulty     OBJECTIVE     Vitals Blood pressure 117/58, pulse 69, temperature 97.5  F (36.4  C), temperature source Oral, resp. rate 20, height 1.524 m (5'), weight 61.2 kg (135 lb), SpO2 93%.          Physical Exam  General: awake, alert, oriented times three   Cardiovascular: RRR, no edema   Chest: lungs are clear to auscultation bilaterally   Abdomen: soft, non-tender, non-distended, bowel sounds present   Neurologic: grossly intact        LABORATORY    ELECTROLYTE PANEL   Recent Labs   Lab 03/19/24  0625 03/18/24  2106 03/18/24  1633 03/18/24  0959 03/18/24  0901 03/18/24  0635 03/17/24  2257     --   --   --   --  138 140   POTASSIUM 5.4*  --   --   --  5.3 6.1* 5.2   CHLORIDE 113*  --   --   --   --  112* 111*   CO2 21*  --   --   --   --  19* 20*   * 156* 96   < >  --  91 165*   CR 1.93*  --   --   --   --  1.75* 1.93*   BUN 45.0*  --   --   --   --  51.0* 57.6*    < > = values in this interval not displayed.      HEMATOLOGY PANEL   Recent Labs   Lab 03/19/24  0625 03/18/24  2129 03/18/24  1551 03/18/24  0635 03/17/24  2257   HGB 7.9* 7.8* 8.0* 7.8* 8.1*   MCV 91  --   --  91 95   WBC 5.5  --   --  7.7 11.5*   *  --   --  118* 172   INR  --   --   --   --  1.06      LIVER AND PANCREAS PANEL   Recent Labs   Lab 03/18/24  0635 03/17/24  2257   AST 18 25   ALT 12 17   ALKPHOS 39* 45   BILITOTAL 0.8 0.4     IMAGING STUDIES        I have reviewed the current diagnostic and laboratory tests.              IMPRESSION   Allen Julian is a 78 year old male with duodenal ulcer  Suspected H Pylori  Biopsies pending but positive in the past  Now on H Pylori treatment- needs to continue for 2 weeks and then BID PPI for 2 months    Needs 1 more day with IV PPI    GI will sign off, please call with any questions or concerns              Nic Naylor MD  Thank you for the opportunity to  participate in the care of this patient.   Please feel free to call me with any questions or concerns.  Phone number [phone].

## 2024-03-20 ENCOUNTER — APPOINTMENT (OUTPATIENT)
Dept: INTERPRETER SERVICES | Facility: CLINIC | Age: 79
End: 2024-03-20
Payer: COMMERCIAL

## 2024-03-20 ENCOUNTER — ANESTHESIA EVENT (OUTPATIENT)
Dept: SURGERY | Facility: HOSPITAL | Age: 79
DRG: 378 | End: 2024-03-20
Payer: COMMERCIAL

## 2024-03-20 ENCOUNTER — ANESTHESIA (OUTPATIENT)
Dept: SURGERY | Facility: HOSPITAL | Age: 79
DRG: 378 | End: 2024-03-20
Payer: COMMERCIAL

## 2024-03-20 LAB
ABO/RH(D): NORMAL
ANION GAP SERPL CALCULATED.3IONS-SCNC: 8 MMOL/L (ref 7–15)
ANTIBODY SCREEN: NEGATIVE
BLD PROD TYP BPU: NORMAL
BLOOD COMPONENT TYPE: NORMAL
BUN SERPL-MCNC: 76.3 MG/DL (ref 8–23)
CALCIUM SERPL-MCNC: 7.5 MG/DL (ref 8.8–10.2)
CHLORIDE SERPL-SCNC: 115 MMOL/L (ref 98–107)
CODING SYSTEM: NORMAL
CREAT SERPL-MCNC: 2.82 MG/DL (ref 0.67–1.17)
CROSSMATCH: NORMAL
DEPRECATED HCO3 PLAS-SCNC: 20 MMOL/L (ref 22–29)
EGFRCR SERPLBLD CKD-EPI 2021: 22 ML/MIN/1.73M2
GLUCOSE BLDC GLUCOMTR-MCNC: 101 MG/DL (ref 70–99)
GLUCOSE BLDC GLUCOMTR-MCNC: 118 MG/DL (ref 70–99)
GLUCOSE BLDC GLUCOMTR-MCNC: 87 MG/DL (ref 70–99)
GLUCOSE SERPL-MCNC: 124 MG/DL (ref 70–99)
HGB BLD-MCNC: 5 G/DL (ref 13.3–17.7)
HGB BLD-MCNC: 5 G/DL (ref 13.3–17.7)
HGB BLD-MCNC: 7 G/DL (ref 13.3–17.7)
HGB BLD-MCNC: 7.2 G/DL (ref 13.3–17.7)
ISSUE DATE AND TIME: NORMAL
PATH REPORT.COMMENTS IMP SPEC: NORMAL
PATH REPORT.COMMENTS IMP SPEC: NORMAL
PATH REPORT.FINAL DX SPEC: NORMAL
PATH REPORT.GROSS SPEC: NORMAL
PATH REPORT.MICROSCOPIC SPEC OTHER STN: NORMAL
PATH REPORT.RELEVANT HX SPEC: NORMAL
PHOTO IMAGE: NORMAL
POTASSIUM SERPL-SCNC: 5 MMOL/L (ref 3.4–5.3)
PROT ELPH PNL UR ELPH: NORMAL
PROT PATTERN SERPL IFE-IMP: NORMAL
PROT PATTERN UR ELPH-IMP: NORMAL
SODIUM SERPL-SCNC: 143 MMOL/L (ref 135–145)
SPECIMEN EXPIRATION DATE: NORMAL
TOTAL PROTEIN SERUM FOR ELP: 4.6 G/DL (ref 6.4–8.3)
UNIT ABO/RH: NORMAL
UNIT NUMBER: NORMAL
UNIT STATUS: NORMAL
UNIT TYPE ISBT: 5100
UPPER GI ENDOSCOPY: NORMAL

## 2024-03-20 PROCEDURE — 99232 SBSQ HOSP IP/OBS MODERATE 35: CPT | Performed by: PHYSICIAN ASSISTANT

## 2024-03-20 PROCEDURE — 250N000013 HC RX MED GY IP 250 OP 250 PS 637: Performed by: STUDENT IN AN ORGANIZED HEALTH CARE EDUCATION/TRAINING PROGRAM

## 2024-03-20 PROCEDURE — 80048 BASIC METABOLIC PNL TOTAL CA: CPT | Performed by: INTERNAL MEDICINE

## 2024-03-20 PROCEDURE — 120N000001 HC R&B MED SURG/OB

## 2024-03-20 PROCEDURE — 36415 COLL VENOUS BLD VENIPUNCTURE: CPT | Performed by: INTERNAL MEDICINE

## 2024-03-20 PROCEDURE — 94640 AIRWAY INHALATION TREATMENT: CPT

## 2024-03-20 PROCEDURE — 86923 COMPATIBILITY TEST ELECTRIC: CPT | Performed by: INTERNAL MEDICINE

## 2024-03-20 PROCEDURE — 250N000011 HC RX IP 250 OP 636: Performed by: INTERNAL MEDICINE

## 2024-03-20 PROCEDURE — 258N000003 HC RX IP 258 OP 636: Performed by: NURSE ANESTHETIST, CERTIFIED REGISTERED

## 2024-03-20 PROCEDURE — 86900 BLOOD TYPING SEROLOGIC ABO: CPT | Performed by: INTERNAL MEDICINE

## 2024-03-20 PROCEDURE — 999N000141 HC STATISTIC PRE-PROCEDURE NURSING ASSESSMENT: Performed by: INTERNAL MEDICINE

## 2024-03-20 PROCEDURE — 99233 SBSQ HOSP IP/OBS HIGH 50: CPT | Performed by: INTERNAL MEDICINE

## 2024-03-20 PROCEDURE — 250N000013 HC RX MED GY IP 250 OP 250 PS 637: Performed by: INTERNAL MEDICINE

## 2024-03-20 PROCEDURE — 85018 HEMOGLOBIN: CPT | Performed by: INTERNAL MEDICINE

## 2024-03-20 PROCEDURE — P9016 RBC LEUKOCYTES REDUCED: HCPCS | Performed by: INTERNAL MEDICINE

## 2024-03-20 PROCEDURE — 272N000001 HC OR GENERAL SUPPLY STERILE: Performed by: INTERNAL MEDICINE

## 2024-03-20 PROCEDURE — C9113 INJ PANTOPRAZOLE SODIUM, VIA: HCPCS | Performed by: INTERNAL MEDICINE

## 2024-03-20 PROCEDURE — 84166 PROTEIN E-PHORESIS/URINE/CSF: CPT | Mod: 26 | Performed by: PATHOLOGY

## 2024-03-20 PROCEDURE — 88342 IMHCHEM/IMCYTCHM 1ST ANTB: CPT | Mod: 26 | Performed by: PATHOLOGY

## 2024-03-20 PROCEDURE — 258N000002 HC RX IP 258 OP 250: Performed by: PHYSICIAN ASSISTANT

## 2024-03-20 PROCEDURE — 250N000013 HC RX MED GY IP 250 OP 250 PS 637: Performed by: PHYSICIAN ASSISTANT

## 2024-03-20 PROCEDURE — 250N000009 HC RX 250: Performed by: INTERNAL MEDICINE

## 2024-03-20 PROCEDURE — 88305 TISSUE EXAM BY PATHOLOGIST: CPT | Mod: 26 | Performed by: PATHOLOGY

## 2024-03-20 PROCEDURE — 0W3P8ZZ CONTROL BLEEDING IN GASTROINTESTINAL TRACT, VIA NATURAL OR ARTIFICIAL OPENING ENDOSCOPIC: ICD-10-PCS | Performed by: INTERNAL MEDICINE

## 2024-03-20 PROCEDURE — 86335 IMMUNFIX E-PHORSIS/URINE/CSF: CPT | Mod: 26 | Performed by: PATHOLOGY

## 2024-03-20 PROCEDURE — 250N000011 HC RX IP 250 OP 636: Performed by: STUDENT IN AN ORGANIZED HEALTH CARE EDUCATION/TRAINING PROGRAM

## 2024-03-20 PROCEDURE — 370N000017 HC ANESTHESIA TECHNICAL FEE, PER MIN: Performed by: INTERNAL MEDICINE

## 2024-03-20 PROCEDURE — 86334 IMMUNOFIX E-PHORESIS SERUM: CPT | Mod: 26 | Performed by: PATHOLOGY

## 2024-03-20 PROCEDURE — 250N000011 HC RX IP 250 OP 636: Performed by: ANESTHESIOLOGY

## 2024-03-20 PROCEDURE — 360N000075 HC SURGERY LEVEL 2, PER MIN: Performed by: INTERNAL MEDICINE

## 2024-03-20 RX ORDER — SODIUM CHLORIDE 450 MG/100ML
INJECTION, SOLUTION INTRAVENOUS CONTINUOUS
Status: DISCONTINUED | OUTPATIENT
Start: 2024-03-20 | End: 2024-03-21

## 2024-03-20 RX ORDER — IPRATROPIUM BROMIDE AND ALBUTEROL SULFATE 2.5; .5 MG/3ML; MG/3ML
3 SOLUTION RESPIRATORY (INHALATION) EVERY 4 HOURS PRN
Status: DISCONTINUED | OUTPATIENT
Start: 2024-03-20 | End: 2024-03-23 | Stop reason: HOSPADM

## 2024-03-20 RX ORDER — PROPOFOL 10 MG/ML
INJECTION, EMULSION INTRAVENOUS PRN
Status: DISCONTINUED | OUTPATIENT
Start: 2024-03-20 | End: 2024-03-20

## 2024-03-20 RX ORDER — LIDOCAINE 40 MG/G
CREAM TOPICAL
Status: DISCONTINUED | OUTPATIENT
Start: 2024-03-20 | End: 2024-03-20 | Stop reason: HOSPADM

## 2024-03-20 RX ORDER — FLUMAZENIL 0.1 MG/ML
0.2 INJECTION, SOLUTION INTRAVENOUS
Status: CANCELLED | OUTPATIENT
Start: 2024-03-20 | End: 2024-03-21

## 2024-03-20 RX ORDER — SODIUM CHLORIDE, SODIUM LACTATE, POTASSIUM CHLORIDE, CALCIUM CHLORIDE 600; 310; 30; 20 MG/100ML; MG/100ML; MG/100ML; MG/100ML
INJECTION, SOLUTION INTRAVENOUS CONTINUOUS PRN
Status: DISCONTINUED | OUTPATIENT
Start: 2024-03-20 | End: 2024-03-20

## 2024-03-20 RX ORDER — ONDANSETRON 2 MG/ML
INJECTION INTRAMUSCULAR; INTRAVENOUS PRN
Status: DISCONTINUED | OUTPATIENT
Start: 2024-03-20 | End: 2024-03-20

## 2024-03-20 RX ADMIN — PROPOFOL 20 MG: 10 INJECTION, EMULSION INTRAVENOUS at 15:31

## 2024-03-20 RX ADMIN — IPRATROPIUM BROMIDE AND ALBUTEROL SULFATE 3 ML: .5; 3 SOLUTION RESPIRATORY (INHALATION) at 17:25

## 2024-03-20 RX ADMIN — SODIUM BICARBONATE 650 MG TABLET 650 MG: at 20:36

## 2024-03-20 RX ADMIN — ONDANSETRON 4 MG: 2 INJECTION INTRAMUSCULAR; INTRAVENOUS at 05:07

## 2024-03-20 RX ADMIN — Medication 1 LOZENGE: at 20:35

## 2024-03-20 RX ADMIN — Medication 1 LOZENGE: at 18:09

## 2024-03-20 RX ADMIN — DOXYCYCLINE HYCLATE 100 MG: 100 CAPSULE ORAL at 08:13

## 2024-03-20 RX ADMIN — BISMUTH SUBSALICYLATE 262 MG: 262 TABLET, CHEWABLE ORAL at 08:13

## 2024-03-20 RX ADMIN — SODIUM CHLORIDE, POTASSIUM CHLORIDE, SODIUM LACTATE AND CALCIUM CHLORIDE: 600; 310; 30; 20 INJECTION, SOLUTION INTRAVENOUS at 15:12

## 2024-03-20 RX ADMIN — SODIUM BICARBONATE 650 MG TABLET 650 MG: at 08:13

## 2024-03-20 RX ADMIN — METRONIDAZOLE 500 MG: 500 TABLET ORAL at 08:13

## 2024-03-20 RX ADMIN — PANTOPRAZOLE SODIUM 40 MG: 40 INJECTION, POWDER, FOR SOLUTION INTRAVENOUS at 20:35

## 2024-03-20 RX ADMIN — BISMUTH SUBSALICYLATE 262 MG: 262 TABLET, CHEWABLE ORAL at 11:20

## 2024-03-20 RX ADMIN — BISMUTH SUBSALICYLATE 262 MG: 262 TABLET, CHEWABLE ORAL at 22:05

## 2024-03-20 RX ADMIN — DOXYCYCLINE HYCLATE 100 MG: 100 CAPSULE ORAL at 20:36

## 2024-03-20 RX ADMIN — PANTOPRAZOLE SODIUM 40 MG: 40 INJECTION, POWDER, FOR SOLUTION INTRAVENOUS at 08:13

## 2024-03-20 RX ADMIN — PROPOFOL 50 MG: 10 INJECTION, EMULSION INTRAVENOUS at 15:21

## 2024-03-20 RX ADMIN — SODIUM CHLORIDE: 4.5 INJECTION, SOLUTION INTRAVENOUS at 16:36

## 2024-03-20 RX ADMIN — HYDRALAZINE HYDROCHLORIDE 25 MG: 25 TABLET, FILM COATED ORAL at 20:36

## 2024-03-20 RX ADMIN — BISMUTH SUBSALICYLATE 262 MG: 262 TABLET, CHEWABLE ORAL at 16:36

## 2024-03-20 RX ADMIN — ROSUVASTATIN CALCIUM 40 MG: 40 TABLET, COATED ORAL at 21:11

## 2024-03-20 RX ADMIN — METRONIDAZOLE 500 MG: 500 TABLET ORAL at 20:35

## 2024-03-20 RX ADMIN — ONDANSETRON 4 MG: 2 INJECTION INTRAMUSCULAR; INTRAVENOUS at 15:31

## 2024-03-20 ASSESSMENT — ACTIVITIES OF DAILY LIVING (ADL)
ADLS_ACUITY_SCORE: 38
ADLS_ACUITY_SCORE: 28
ADLS_ACUITY_SCORE: 31
ADLS_ACUITY_SCORE: 38
ADLS_ACUITY_SCORE: 28
ADLS_ACUITY_SCORE: 38
ADLS_ACUITY_SCORE: 38
ADLS_ACUITY_SCORE: 28
ADLS_ACUITY_SCORE: 38
ADLS_ACUITY_SCORE: 28
ADLS_ACUITY_SCORE: 28
ADLS_ACUITY_SCORE: 38
ADLS_ACUITY_SCORE: 28
ADLS_ACUITY_SCORE: 38
ADLS_ACUITY_SCORE: 28

## 2024-03-20 NOTE — ANESTHESIA PREPROCEDURE EVALUATION
Anesthesia Pre-Procedure Evaluation    Patient: Allen Julian   MRN: [id number] : 1945        Procedure : Procedure(s):  ESOPHAGOGASTRODUODENOSCOPY          History reviewed. No pertinent past medical history.   Past Surgical History:   Procedure Laterality Date    ESOPHAGOSCOPY, GASTROSCOPY, DUODENOSCOPY (EGD), COMBINED N/A 10/7/2023    Procedure: ESOPHAGOGASTRODUODENOSCOPY;  Surgeon: Osiel Stinson MD;  Location: [facility] OR    ESOPHAGOSCOPY, GASTROSCOPY, DUODENOSCOPY (EGD), COMBINED N/A 3/18/2024    Procedure: ESOPHAGOGASTRODUODENOSCOPY WITH BiCAP AND BIOPSY;  Surgeon: Nic Naylor MD;  Location: [facility] OR      No Known Allergies   Social History     Tobacco Use    Smoking status: Never    Smokeless tobacco: Never   Substance Use Topics    Alcohol use: Never      Wt Readings from Last 1 Encounters:   24 61.2 kg (135 lb)        Anesthesia Evaluation   Pt has had prior anesthetic. Type: MAC and General.    No history of anesthetic complications       ROS/MED HX  ENT/Pulmonary:       Neurologic:    (-) no CVA and no TIA   Cardiovascular:     (+)  hypertension- -   -  - -                           valvular problems/murmurs type: AI          METS/Exercise Tolerance:     Hematologic:     (+)      anemia, history of blood transfusion,         Musculoskeletal:   (+)  arthritis,             GI/Hepatic: Comment: GI bleed, still requiring transfusions      Renal/Genitourinary:     (+) renal disease, type: CRI, Pt does not require dialysis,           Endo:     (+)          thyroid problem,            Psychiatric/Substance Use:       Infectious Disease:       Malignancy:       Other:            Physical Exam    Airway        Mallampati: III   TM distance: > 3 FB   Neck ROM: full     Respiratory Devices and Support         Dental       (+) Edentulous    B=Bridge, C=Chipped, L=Loose, M=Missing    Cardiovascular          Rhythm and rate: regular     Pulmonary           breath sounds clear to  "auscultation           OUTSIDE LABS:  CBC:   Lab Results   Component Value Date    WBC 5.5 03/19/2024    WBC 7.7 03/18/2024    HGB 7.2 (L) 03/20/2024    HGB 5.0 (LL) 03/20/2024    HCT 24.5 (L) 03/19/2024    HCT 24.1 (L) 03/18/2024     (L) 03/19/2024     (L) 03/18/2024     BMP:   Lab Results   Component Value Date     03/20/2024     03/19/2024    POTASSIUM 5.0 03/20/2024    POTASSIUM 5.4 (H) 03/19/2024    CHLORIDE 115 (H) 03/20/2024    CHLORIDE 113 (H) 03/19/2024    CO2 20 (L) 03/20/2024    CO2 21 (L) 03/19/2024    BUN 76.3 (H) 03/20/2024    BUN 45.0 (H) 03/19/2024    CR 2.82 (H) 03/20/2024    CR 1.93 (H) 03/19/2024    GLC 87 03/20/2024     (H) 03/20/2024     COAGS:   Lab Results   Component Value Date    PTT 23 03/17/2024    INR 1.06 03/17/2024     POC: No results found for: \"BGM\", \"HCG\", \"HCGS\"  HEPATIC:   Lab Results   Component Value Date    ALBUMIN 3.4 (L) 03/18/2024    PROTTOTAL 4.9 (L) 03/18/2024    ALT 12 03/18/2024    AST 18 03/18/2024    ALKPHOS 39 (L) 03/18/2024    BILITOTAL 0.8 03/18/2024     OTHER:   Lab Results   Component Value Date    LACT 1.8 03/18/2024    A1C 5.6 03/17/2024    SAVI 7.5 (L) 03/20/2024    PHOS 3.3 03/19/2024    MAG 2.3 03/19/2024    LIPASE 22 10/06/2023    TSH 17.51 (H) 10/06/2023    T4 0.74 (L) 10/06/2023       Anesthesia Plan    ASA Status:  3, emergent    NPO Status:  NPO Appropriate    Anesthesia Type: MAC.     - Reason for MAC: straight local not clinically adequate, immobility needed   Induction: Intravenous.   Maintenance: TIVA.        Consents    Anesthesia Plan(s) and associated risks, benefits, and realistic alternatives discussed. Questions answered and patient/representative(s) expressed understanding.     - Discussed: Risks, Benefits and Alternatives for BOTH SEDATION and the PROCEDURE were discussed     - Discussed with:             Postoperative Care       PONV prophylaxis: Background Propofol Infusion, Ondansetron (or other 5HT-3) "     Comments:               Alena Dawson MD    I have reviewed the pertinent notes and labs in the chart from the past 30 days and (re)examined the patient.  Any updates or changes from those notes are reflected in this note.

## 2024-03-20 NOTE — PROGRESS NOTES
RENAL PROGRESS NOTE    ASSESSMENT & PLAN:   Acute kidney injury, chronic kidney disease - With baseline creatinine of 1.5-1.8 range, past urinalyses have shown bland urine sediment.  Imaging was negative for obstruction. Suspect hypertensive nephrosclerosis as likely etiology for his CKD He received 75ml iodinated contrast on 3/17, may have some component of ROSANGELA. Recommend maintaining hemodynamics as much as able and avoid further nephrotoxins. Ordered pro/cr ratio, paraprotein studies to complete typical CKD evaluation. Creatinine increased significantly today, suspect due to worsened blood loss/anemia with hemoglobin decreasing to 5 today as well as contrast nephropathy (creatinine typically peaks 2-5 days post contrast). Continue to support hemodynamics, avoid nephrotoxins     Hematemesis, blood loss anemia - Presented with multiple episodes of hematemesis and rectal bleeding, s/p PRBCs and on protonix. GI following, s/p EGD on 3/17 which showed non-bleeding gastric ulcers and non-bleeding duodenal ulcer, visible vessel was treated with cautery. Treating H. Pylori and on-going PPI. Now with acute drop in hemoglobin, receiving PRBCs and planning for repeat EGD today     Hyperkalemia - With chronic kidney disease, metabolic acidosis and suspected GI bleed.      Metabolic acidosis - In the setting of chronic kidney disease. Started sodium bicarbonate     Bladder thickening - Noted on CT, urinalysis negative     Hypertension - As outpatient on amlodipine, valsartan, hydralazine, isosorbide     Hyperlipidemia - On statin        SUBJECTIVE:  Seen with family at bedside. Feeling poorly today. To have EGD later today as hemoglobin acutely dropped.     OBJECTIVE:  Physical Exam   Temp: 98.2  F (36.8  C) Temp src: Oral BP: (!) 148/61 Pulse: 59   Resp: 20 SpO2: 97 % O2 Device: None (Room air)    Vitals:    03/17/24 2254   Weight: 61.2 kg (135 lb)     Vital Signs with Ranges  Temp:  [97.7  F (36.5  C)-98.2  F (36.8  C)]  98.2  F (36.8  C)  Pulse:  [57-76] 59  Resp:  [16-20] 20  BP: (109-149)/(49-65) 148/61  SpO2:  [92 %-97 %] 97 %  I/O last 3 completed shifts:  In: 520 [P.O.:520]  Out: 575 [Urine:575]        Patient Vitals for the past 72 hrs:   Weight   03/17/24 2254 61.2 kg (135 lb)     Intake/Output Summary (Last 24 hours) at 3/20/2024 1230  Last data filed at 3/20/2024 1212  Gross per 24 hour   Intake 1223 ml   Output 575 ml   Net 648 ml       PHYSICAL EXAM:  General - Tired appearing, resting in bed  Cardiovascular - Normal S1S2, no rub  Respiratory - Clear to auscultation bilaterally, no crackles or wheezes  Abdomen - Soft, non-tender, bowel sounds present.    Extremities - No lower extremity edema bilaterally  Integumentary - Warm, dry, no rash  Neurologic - Grossly intact, no focal deficits      LABORATORY STUDIES:     Recent Labs   Lab 03/20/24  0736 03/20/24  0614 03/19/24  0625 03/18/24  2129 03/18/24  1551 03/18/24  0635 03/17/24  2257   WBC  --   --  5.5  --   --  7.7 11.5*   RBC  --   --  2.69*  --   --  2.66* 2.66*   HGB 5.0* 5.0* 7.9* 7.8* 8.0* 7.8* 8.1*   HCT  --   --  24.5*  --   --  24.1* 25.3*   PLT  --   --  139*  --   --  118* 172       Basic Metabolic Panel:  Recent Labs   Lab 03/20/24  0614 03/19/24  2105 03/19/24  1713 03/19/24  0625 03/18/24  2106 03/18/24  1633 03/18/24  0959 03/18/24  0901 03/18/24  0635 03/17/24  2257     --   --  139  --   --   --   --  138 140   POTASSIUM 5.0  --   --  5.4*  --   --   --  5.3 6.1* 5.2   CHLORIDE 115*  --   --  113*  --   --   --   --  112* 111*   CO2 20*  --   --  21*  --   --   --   --  19* 20*   BUN 76.3*  --   --  45.0*  --   --   --   --  51.0* 57.6*   CR 2.82*  --   --  1.93*  --   --   --   --  1.75* 1.93*   * 129* 120* 143* 156* 96   < >  --  91 165*   SAVI 7.5*  --   --  8.3*  --   --   --   --  7.8* 8.0*    < > = values in this interval not displayed.       Recent Labs   Lab Test 03/20/24  0736 03/20/24  0614 03/19/24  0625 03/18/24  155  03/18/24  0635 03/17/24  2257   INR  --   --   --   --   --  1.06   WBC  --   --  5.5  --  7.7 11.5*   HGB 5.0* 5.0* 7.9*   < > 7.8* 8.1*   PLT  --   --  139*  --  118* 172    < > = values in this interval not displayed.         Personally reviewed current labs      Maryann Camara PA-C  [facility]  [phone]

## 2024-03-20 NOTE — SIGNIFICANT EVENT
Significant Event Note    Time of event: 7:23 AM March 20, 2024    Description of event:  Received message from patient's RN stating hemoglobin 5.0 this morning.  Discussed with night nurse reported 1 dark stool overnight, hemodynamically stable    Plan:  Recheck hemoglobin stat to rule out lab error.    Type and crossmatch and ordered 2 unit packed RBC transfusion just in case 5.0 is to hemoglobin level.    Keep patient n.p.o.  Personally discussed with gastroenterologist Dr. Naylor    Discussed with: bedside nurse    Ace Dill MD

## 2024-03-20 NOTE — PROGRESS NOTES
[facility]    Medicine Progress Note - Hospitalist Service    Date of Admission:  3/17/2024    Assessment & Plan   Allen Julian is a 78 year old male with past medical history of HTN, hypothyroidism, CAD, GI bleed due to gastric/duodenal ulcer requiring hospitalization on 10/2023, also required blood transfusion at that time who presented to ED for evaluation of hematemesis, rectal bleeding, reported initially rectal bleeding was red then transition to black stool.  Patient admitted on 3/17/2024 for further management.      Acute GI bleeding, ABLA; suspect due to PUD  History of H. pylori PUD;  On admission, CTA abdomen and pelvis negative for acute GI bleeding.  --On 3/18, EGD reported nonbleeding gastric ulcer with no stigmata of bleeding, also nonbleeding duodenal ulcer with visible vessel treated with bipolar cautery  -- On 3/19, personally discussed with gastroenterologist, recommended twice daily PPI and H. pylori treatment.  Plan for repeat EGD in 2 months  -- Overnight patient had 1 dark stool and another darker stool earlier this morning.  Hemoglobin dropped to 5.0.  Hemodynamically stable.  Patient getting second unit PRBC transfusion, check hemoglobin after that.  Personally discussed with gastroenterologist, Dr. Naylor and requesting hemoglobin 7 before EGD.  Requested nursing staff to check hemoglobin after second unit transfusion.  Transfuse if hemoglobin 7 or less or hemodynamically unstable    Acute kidney injury on CKD stage III;  Metabolic acidosis;  Hyperkalemia;  -- Patient did received IV contrast during this hospitalization  -- Creatinine trending up nephrology consulted, appreciated input.   --Hypokalemia improving.  However creatinine worsening likely contrast-induced.  --Avoid nephrotoxic medications.    CT imaging reported diffuse thickening of the urinary bladder;  --No hydronephrosis, imaging reported prominent prostate.    --No urinary complaints and UA  unremarkable  -- May consider Flomax as likely BPH    Essential hypertension; fairly controlled  --Blood pressure medications held on admission due to GI bleed, resume gradually with holding parameters.  Monitor vitals closely    History of hyperlipidemia  Holding aspirin for now as above  Continue with statin     Physical deconditioning/weakness  PT eval  Fall precautions             Diet: NPO per Anesthesia Guidelines for Procedure/Surgery Except for: Meds    DVT Prophylaxis: no pharmacology agent due to gi bleeding  Jimenez Catheter: Not present  Lines: None     Cardiac Monitoring: None  Code Status: Full Code      Clinically Significant Risk Factors        # Hyperkalemia: Highest K = 5.4 mmol/L in last 2 days, will monitor as appropriate       # Hypoalbuminemia: Lowest albumin = 3 g/dL at 3/17/2024 10:57 PM, will monitor as appropriate    # Acute Kidney Injury, unspecified: based on a >150% or 0.3 mg/dL increase in last creatinine compared to past 90 day average, will monitor renal function  # Hypertension: Noted on problem list        # Overweight: Estimated body mass index is 26.37 kg/m  as calculated from the following:    Height as of this encounter: 1.524 m (5').    Weight as of this encounter: 61.2 kg (135 lb)., PRESENT ON ADMISSION            Disposition Plan      Expected Discharge Date: 03/21/2024      Destination: home with family              Ace Dill MD  Hospitalist Service  [facility]  Securely message with MailWriter (more info)  Text page via [facility] Paging/Directory   ______________________________________________________________________    Interval History   Patient seen and examined.  Notes, labs, imaging report personally reviewed.  Overnight 1 darker stool, another darker stool earlier this morning, patient reported feeling dizzy with last bowel movemen.  Patient had some abdominal discomfort and nausea which is improving.  Denied short of breath or chest pain.  Discussed  with nursing staff multiple times.  Discussed with gastroenterologist multiple times.  Patient's son at bedside and help with interpretation and declined professional .      Physical Exam   Vital Signs: Temp: 98.2  F (36.8  C) Temp src: Oral BP: 122/53 Pulse: 63   Resp: 16 SpO2: 95 % O2 Device: None (Room air)    Weight: 135 lbs 0 oz      General: Not in obvious distress.  Looks pale  HEENT: Normocephalic, supple neck  Chest: Clear to auscultation bilateral anteriorly, no wheezing  Heart: S1S2 normal, regular  Abdomen: Soft.  No tenderness appreciated. Bowel sounds- active.  Neuro: alert and awake, grossly non-focal      Medical Decision Making             Data     I have personally reviewed the following data over the past 24 hrs:    N/A  \   5.0 (LL)   / N/A     143 115 (H) 76.3 (H) /  124 (H)   5.0 20 (L) 2.82 (H) \       Imaging results reviewed over the past 24 hrs:   No results found for this or any previous visit (from the past 24 hour(s)).

## 2024-03-20 NOTE — ANESTHESIA POSTPROCEDURE EVALUATION
Patient: Allen N Pha    Procedure: Procedure(s):  ESOPHAGOGASTRODUODENOSCOPY       Anesthesia Type:  MAC    Note:  Disposition: Outpatient   Postop Pain Control: Uneventful            Sign Out: Well controlled pain   PONV: No   Neuro/Psych: Uneventful            Sign Out: Acceptable/Baseline neuro status   Airway/Respiratory: Uneventful            Sign Out: Acceptable/Baseline resp. status   CV/Hemodynamics: Uneventful            Sign Out: Acceptable CV status; No obvious hypovolemia; No obvious fluid overload   Other NRE: NONE   DID A NON-ROUTINE EVENT OCCUR? No           Last vitals:  Vitals Value Taken Time   /66    Temp     Pulse 57    Resp 18    SpO2 98%        Electronically Signed By: Alena Dawson MD  March 20, 2024  3:49 PM

## 2024-03-20 NOTE — PROGRESS NOTES
Kellen from [facility] [phone], call if need a TCU list for [facility].    Fe Camara is CC for patient from [facility]. Please call and update her with discharge plans.

## 2024-03-20 NOTE — ANESTHESIA CARE TRANSFER NOTE
Patient: Allen N Pha    Procedure: Procedure(s):  ESOPHAGOGASTRODUODENOSCOPY       Diagnosis: Gastrointestinal hemorrhage, unspecified gastrointestinal hemorrhage type [K92.2]  Diagnosis Additional Information: No value filed.    Anesthesia Type:   MAC     Note:    Oropharynx: oropharynx clear of all foreign objects and spontaneously breathing  Level of Consciousness: drowsy  Oxygen Supplementation: nasal cannula  Level of Supplemental Oxygen (L/min / FiO2): 3      Vital Signs Stable: post-procedure vital signs reviewed and stable  Report to RN Given: handoff report given  Patient transferred to: Medical/Surgical Unit    Handoff Report: Identifed the Patient, Identified the Reponsible Provider, Reviewed the pertinent medical history, Discussed the surgical course, Reviewed Intra-OP anesthesia mangement and issues during anesthesia, Set expectations for post-procedure period and Allowed opportunity for questions and acknowledgement of understanding      Vitals:  Vitals Value Taken Time   /63    Temp     Pulse 65    Resp 17    SpO2 98        Electronically Signed By: DONALD Harris CRNA  March 20, 2024  3:55 PM

## 2024-03-20 NOTE — PLAN OF CARE
Problem: Adult Inpatient Plan of Care  Goal: Optimal Comfort and Wellbeing  Outcome: Progressing     Problem: Gastrointestinal Bleeding  Goal: Hemostasis  Outcome: Progressing     Problem: Anemia  Goal: Anemia Symptom Improvement  Intervention: Monitor and Manage Anemia  Recent Flowsheet Documentation  Taken 3/19/2024 1630 by Meghna Cagle, RN  Safety Promotion/Fall Prevention:   activity supervised   clutter free environment maintained   patient and family education   Goal Outcome Evaluation:       Pt denied pain and had stable vital signs. Pt rested between visits from family/friends. Pt and family educated about diet, activity, and senna to promote consistent Bms without straining, family worried about rectal bleeding recurring.

## 2024-03-20 NOTE — PRE-PROCEDURE
GENERAL PRE-PROCEDURE:   Procedure:  Esophagogastroduodenoscopy  Date/Time:  3/20/2024 2:34 PM    Verbal consent obtained?: Yes    Written consent obtained?: Yes    Risks and benefits: Risks, benefits and alternatives were discussed    Consent given by:  Patient  Patient states understanding of procedure being performed: Yes    Patient's understanding of procedure matches consent: Yes    Procedure consent matches procedure scheduled: Yes    Expected level of sedation:  Moderate  Appropriately NPO:  Yes  ASA Class:  3  Mallampati  :  Grade 2- soft palate, base of uvula, tonsillar pillars, and portion of posterior pharyngeal wall visible  Lungs:  Lungs clear with good breath sounds bilaterally  Heart:  Normal heart sounds and rate and diastolic murmur  History & Physical reviewed:  History and physical reviewed and no updates needed  Statement of review:  I have reviewed the lab findings, diagnostic data, medications, and the plan for sedation

## 2024-03-20 NOTE — PLAN OF CARE
Problem: Adult Inpatient Plan of Care  Goal: Plan of Care Review  Description: The Plan of Care Review/Shift note should be completed every shift.  The Outcome Evaluation is a brief statement about your assessment that the patient is improving, declining, or no change.  This information will be displayed automatically on your shift  note.  Outcome: Progressing   Goal Outcome Evaluation:    Patient with 3 large black stools with red outline in bedpan or toilet this shift. 2 units PRBCs transfused, patient color much improved post transfusion. Hgb recheck 7.2 Very pale at start of shift. Complains of intermittent abd pain. NPO all shift. Heart rate in 50s, -160. Son and granddaughter at bedside to help translate. Down for EGD at 1415.

## 2024-03-21 LAB
ALBUMIN SERPL BCG-MCNC: 2.8 G/DL (ref 3.5–5.2)
ALBUMIN SERPL ELPH-MCNC: 2.8 G/DL (ref 3.7–5.1)
ALPHA1 GLOB SERPL ELPH-MCNC: 0.2 G/DL (ref 0.2–0.4)
ALPHA2 GLOB SERPL ELPH-MCNC: 0.5 G/DL (ref 0.5–0.9)
ANION GAP SERPL CALCULATED.3IONS-SCNC: 3 MMOL/L (ref 7–15)
B-GLOBULIN SERPL ELPH-MCNC: 0.5 G/DL (ref 0.6–1)
BUN SERPL-MCNC: 65.1 MG/DL (ref 8–23)
CALCIUM SERPL-MCNC: 7.9 MG/DL (ref 8.8–10.2)
CHLORIDE SERPL-SCNC: 115 MMOL/L (ref 98–107)
CREAT SERPL-MCNC: 2.45 MG/DL (ref 0.67–1.17)
DEPRECATED HCO3 PLAS-SCNC: 23 MMOL/L (ref 22–29)
EGFRCR SERPLBLD CKD-EPI 2021: 26 ML/MIN/1.73M2
ERYTHROCYTE [DISTWIDTH] IN BLOOD BY AUTOMATED COUNT: 15.8 % (ref 10–15)
GAMMA GLOB SERPL ELPH-MCNC: 0.6 G/DL (ref 0.7–1.6)
GLUCOSE BLDC GLUCOMTR-MCNC: 126 MG/DL (ref 70–99)
GLUCOSE BLDC GLUCOMTR-MCNC: 82 MG/DL (ref 70–99)
GLUCOSE BLDC GLUCOMTR-MCNC: 92 MG/DL (ref 70–99)
GLUCOSE SERPL-MCNC: 95 MG/DL (ref 70–99)
HCT VFR BLD AUTO: 22.7 % (ref 40–53)
HGB BLD-MCNC: 7.4 G/DL (ref 13.3–17.7)
HGB BLD-MCNC: 7.9 G/DL (ref 13.3–17.7)
M PROTEIN SERPL ELPH-MCNC: 0 G/DL
MCH RBC QN AUTO: 29.5 PG (ref 26.5–33)
MCHC RBC AUTO-ENTMCNC: 32.6 G/DL (ref 31.5–36.5)
MCV RBC AUTO: 90 FL (ref 78–100)
PHOSPHATE SERPL-MCNC: 3.9 MG/DL (ref 2.5–4.5)
PLATELET # BLD AUTO: 106 10E3/UL (ref 150–450)
POTASSIUM SERPL-SCNC: 5.3 MMOL/L (ref 3.4–5.3)
PROT PATTERN SERPL ELPH-IMP: ABNORMAL
RBC # BLD AUTO: 2.51 10E6/UL (ref 4.4–5.9)
SODIUM SERPL-SCNC: 141 MMOL/L (ref 135–145)
WBC # BLD AUTO: 12.7 10E3/UL (ref 4–11)

## 2024-03-21 PROCEDURE — 85027 COMPLETE CBC AUTOMATED: CPT | Performed by: INTERNAL MEDICINE

## 2024-03-21 PROCEDURE — C9113 INJ PANTOPRAZOLE SODIUM, VIA: HCPCS | Performed by: INTERNAL MEDICINE

## 2024-03-21 PROCEDURE — 82040 ASSAY OF SERUM ALBUMIN: CPT | Performed by: INTERNAL MEDICINE

## 2024-03-21 PROCEDURE — 250N000013 HC RX MED GY IP 250 OP 250 PS 637: Performed by: STUDENT IN AN ORGANIZED HEALTH CARE EDUCATION/TRAINING PROGRAM

## 2024-03-21 PROCEDURE — 85018 HEMOGLOBIN: CPT | Performed by: INTERNAL MEDICINE

## 2024-03-21 PROCEDURE — 99232 SBSQ HOSP IP/OBS MODERATE 35: CPT | Performed by: INTERNAL MEDICINE

## 2024-03-21 PROCEDURE — 250N000013 HC RX MED GY IP 250 OP 250 PS 637: Performed by: PHYSICIAN ASSISTANT

## 2024-03-21 PROCEDURE — 99232 SBSQ HOSP IP/OBS MODERATE 35: CPT | Performed by: NURSE PRACTITIONER

## 2024-03-21 PROCEDURE — 84165 PROTEIN E-PHORESIS SERUM: CPT | Mod: 26 | Performed by: PATHOLOGY

## 2024-03-21 PROCEDURE — 120N000001 HC R&B MED SURG/OB

## 2024-03-21 PROCEDURE — 250N000013 HC RX MED GY IP 250 OP 250 PS 637: Performed by: INTERNAL MEDICINE

## 2024-03-21 PROCEDURE — 250N000011 HC RX IP 250 OP 636: Performed by: INTERNAL MEDICINE

## 2024-03-21 PROCEDURE — 36415 COLL VENOUS BLD VENIPUNCTURE: CPT | Performed by: INTERNAL MEDICINE

## 2024-03-21 RX ADMIN — PANTOPRAZOLE SODIUM 40 MG: 40 INJECTION, POWDER, FOR SOLUTION INTRAVENOUS at 21:08

## 2024-03-21 RX ADMIN — PHENOL 1 ML: 1.5 LIQUID ORAL at 14:42

## 2024-03-21 RX ADMIN — PHENOL 1 ML: 1.5 LIQUID ORAL at 16:51

## 2024-03-21 RX ADMIN — ROSUVASTATIN CALCIUM 40 MG: 40 TABLET, COATED ORAL at 21:08

## 2024-03-21 RX ADMIN — PHENOL 1 ML: 1.5 LIQUID ORAL at 08:41

## 2024-03-21 RX ADMIN — PHENOL 1 ML: 1.5 LIQUID ORAL at 11:55

## 2024-03-21 RX ADMIN — HYDRALAZINE HYDROCHLORIDE 25 MG: 25 TABLET, FILM COATED ORAL at 14:41

## 2024-03-21 RX ADMIN — PHENOL 1 ML: 1.5 LIQUID ORAL at 21:07

## 2024-03-21 RX ADMIN — PANTOPRAZOLE SODIUM 40 MG: 40 INJECTION, POWDER, FOR SOLUTION INTRAVENOUS at 08:15

## 2024-03-21 RX ADMIN — SODIUM BICARBONATE 650 MG TABLET 650 MG: at 21:09

## 2024-03-21 RX ADMIN — DOXYCYCLINE HYCLATE 100 MG: 100 CAPSULE ORAL at 08:15

## 2024-03-21 RX ADMIN — ISOSORBIDE MONONITRATE 30 MG: 30 TABLET, EXTENDED RELEASE ORAL at 08:54

## 2024-03-21 RX ADMIN — BISMUTH SUBSALICYLATE 262 MG: 262 TABLET, CHEWABLE ORAL at 16:51

## 2024-03-21 RX ADMIN — BISMUTH SUBSALICYLATE 262 MG: 262 TABLET, CHEWABLE ORAL at 08:15

## 2024-03-21 RX ADMIN — DOXYCYCLINE HYCLATE 100 MG: 100 CAPSULE ORAL at 21:08

## 2024-03-21 RX ADMIN — BISMUTH SUBSALICYLATE 262 MG: 262 TABLET, CHEWABLE ORAL at 21:08

## 2024-03-21 RX ADMIN — BISMUTH SUBSALICYLATE 262 MG: 262 TABLET, CHEWABLE ORAL at 11:54

## 2024-03-21 RX ADMIN — HYDRALAZINE HYDROCHLORIDE 25 MG: 25 TABLET, FILM COATED ORAL at 21:08

## 2024-03-21 RX ADMIN — METRONIDAZOLE 500 MG: 500 TABLET ORAL at 08:15

## 2024-03-21 RX ADMIN — HYDRALAZINE HYDROCHLORIDE 25 MG: 25 TABLET, FILM COATED ORAL at 08:15

## 2024-03-21 RX ADMIN — SODIUM BICARBONATE 650 MG TABLET 650 MG: at 08:15

## 2024-03-21 RX ADMIN — METRONIDAZOLE 500 MG: 500 TABLET ORAL at 21:08

## 2024-03-21 RX ADMIN — METRONIDAZOLE 500 MG: 500 TABLET ORAL at 14:42

## 2024-03-21 ASSESSMENT — ACTIVITIES OF DAILY LIVING (ADL)
ADLS_ACUITY_SCORE: 32
ADLS_ACUITY_SCORE: 32
ADLS_ACUITY_SCORE: 38
ADLS_ACUITY_SCORE: 37
ADLS_ACUITY_SCORE: 32
ADLS_ACUITY_SCORE: 37
ADLS_ACUITY_SCORE: 38
ADLS_ACUITY_SCORE: 37
ADLS_ACUITY_SCORE: 32
ADLS_ACUITY_SCORE: 37
ADLS_ACUITY_SCORE: 38
ADLS_ACUITY_SCORE: 32
ADLS_ACUITY_SCORE: 37
ADLS_ACUITY_SCORE: 32
ADLS_ACUITY_SCORE: 37
ADLS_ACUITY_SCORE: 37
ADLS_ACUITY_SCORE: 38
ADLS_ACUITY_SCORE: 32
ADLS_ACUITY_SCORE: 32
ADLS_ACUITY_SCORE: 38
ADLS_ACUITY_SCORE: 38

## 2024-03-21 NOTE — PLAN OF CARE
Problem: Anemia  Goal: Anemia Symptom Improvement  Outcome: Progressing  Intervention: Monitor and Manage Anemia  Recent Flowsheet Documentation  Taken 3/21/2024 0045 by Mckay Pool, RN  Safety Promotion/Fall Prevention:   activity supervised   clutter free environment maintained   patient and family education     Problem: Gastrointestinal Bleeding  Goal: Hemostasis  Outcome: Progressing    Goal Outcome Evaluation  [id number]:  Pt denied pain. No BMs overnight. IVF infusing. Son at bedside.    Mckay Pool RN

## 2024-03-21 NOTE — PLAN OF CARE
Problem: Adult Inpatient Plan of Care  Goal: Optimal Comfort and Wellbeing  Outcome: Progressing     Problem: Anemia  Goal: Anemia Symptom Improvement  Outcome: Progressing  Intervention: Monitor and Manage Anemia  Recent Flowsheet Documentation  Taken 3/21/2024 1000 by Vero Gastelum RN  Safety Promotion/Fall Prevention: activity supervised   Goal Outcome Evaluation:  Son in room and is supportive for patient. Diet advanced and IVF d/c'd. Hgb-7.4, and to be rechecked later today.  No visible bleeding this shift. Using mouth spray for slight throat discomfort.

## 2024-03-21 NOTE — PROGRESS NOTES
RENAL PROGRESS NOTE    PLAN:  Stop IVF's  Push oral intake  Likely discharge tomorrow assuming ongoing stability,   Should get established with renal outpt, have scheduled a f/up appt in our clinic on 4/17 at 11:30 with ALEXA Hawthorne    ASSESSMENT & PLAN:   Acute kidney injury, chronic kidney disease   Non-oliguric  Creat down-trending today, no sig metabolic derangement today, expect to see ongoing improvement  baseline creatinine of 1.5-1.8 range, past urinalyses have shown bland urine sediment.    Imaging was negative for obstruction.   Suspect hypertensive nephrosclerosis as likely etiology for his CKD, unclear if he follows with nephrology, but would be a good idea to get established (appt set for 4/17  with ALEXA Hawthorne  received 75ml iodinated contrast on 3/17, may have some component of ROSANGELA.   Recommend maintaining hemodynamics as much as able and avoid further nephrotoxins. Ordered pro/cr ratio, paraprotein studies to complete typical CKD evaluation.   Continue to support hemodynamics, avoid nephrotoxins    Hyperkalemia -   chronic kidney disease, metabolic acidosis and suspected GI bleed contributed, currently stable, on bicarb.      Metabolic acidosis -   In the setting of chronic kidney disease. Started low dose sodium bicarbonate this admission, cont for now      Hematemesis, blood loss anemia -   Presented with multiple episodes of hematemesis and rectal bleeding, s/p PRBCs and on protonix. GI following, s/p EGD on 3/17 which showed non-bleeding gastric ulcers and duodenal ulcers, visible vessel was treated with cautery.   Treating H. Pylori and on-going PPI.   receiving PRBCs per hosp service, hgb stable 7's this a.m. (nadar 5 on 3/19)      Bladder thickening -   Noted on CT, urinalysis negative     Hypertension -   Mostly controlled,  As outpatient on amlodipine, valsartan (on hold with Anton and hyperK), hydralazine, isosorbide     Hyperlipidemia -   On statin    SUBJECTIVE:  pt seen in room,  son is present , says pt is eating a little and drinking more.  Urinating ok, no further obvious GI bleeding.  Chart reviewed.  This is my first time meeting pt.  PRIYANK's son reports possible discharge tomorrow assuming ongoing improvement and no further bleeding     OBJECTIVE:  Physical Exam   Temp: 98.1  F (36.7  C) Temp src: Oral BP: (!) 146/60 (RN NOTIFIED) Pulse: 60   Resp: 20 SpO2: 94 % O2 Device: None (Room air)    Vitals:    24 2254   Weight: 61.2 kg (135 lb)     Vital Signs with Ranges  Temp:  [97.7  F (36.5  C)-98.5  F (36.9  C)] 98.1  F (36.7  C)  Pulse:  [57-82] 60  Resp:  [18-20] 20  BP: (115-158)/(56-67) 146/60  SpO2:  [92 %-99 %] 94 %  I/O last 3 completed shifts:  In: 1768 [I.V.:755]  Out: 300 [Urine:300]    Temp (24hrs), Av.1  F (36.7  C), Min:97.7  F (36.5  C), Max:98.5  F (36.9  C)      No data found.  Intake/Output Summary (Last 24 hours) at 3/21/2024 0959  Last data filed at 3/21/2024 0600  Gross per 24 hour   Intake 1768 ml   Output 300 ml   Net 1468 ml       PHYSICAL EXAM:  General - Alert and oriented x3, appears comfortable, NAD, son is present   Cardiovascular - Rateand SBP mostly controlled   Respiratory - sats good on RA   Abd: BS present, no guarding or pain with palpation, no ascites  Extremities - No lower extremity edema bilaterally  Skin: dry, intact, no rash, good turgor  Neuro:  Grossly intact, no focal deficits  MSK:  Grossly intact  Psych:  Normal affect  UO not all documented  Pt looks euvolemic     LABORATORY STUDIES:     Recent Labs   Lab 24  0734 24  1852 24  1300 24  0736 24  0614 24  0625 24  1551 24  0635 24  2257   WBC 12.7*  --   --   --   --  5.5  --  7.7 11.5*   RBC 2.51*  --   --   --   --  2.69*  --  2.66* 2.66*   HGB 7.4* 7.0* 7.2* 5.0* 5.0* 7.9*   < > 7.8* 8.1*   HCT 22.7*  --   --   --   --  24.5*  --  24.1* 25.3*   *  --   --   --   --  139*  --  118* 172    < > = values in this interval not  displayed.       Basic Metabolic Panel:  Recent Labs   Lab 03/21/24  0734 03/21/24  0309 03/20/24  2104 03/20/24  1716 03/20/24  1342 03/20/24  0614 03/19/24  1713 03/19/24  0625 03/18/24  0959 03/18/24  0901 03/18/24  0635 03/17/24  2257     --   --   --   --  143  --  139  --   --  138 140   POTASSIUM 5.3  --   --   --   --  5.0  --  5.4*  --  5.3 6.1* 5.2   CHLORIDE 115*  --   --   --   --  115*  --  113*  --   --  112* 111*   CO2 23  --   --   --   --  20*  --  21*  --   --  19* 20*   BUN 65.1*  --   --   --   --  76.3*  --  45.0*  --   --  51.0* 57.6*   CR 2.45*  --   --   --   --  2.82*  --  1.93*  --   --  1.75* 1.93*   GLC 95 92 101* 118* 87 124*   < > 143*   < >  --  91 165*   SAVI 7.9*  --   --   --   --  7.5*  --  8.3*  --   --  7.8* 8.0*    < > = values in this interval not displayed.       INR  Recent Labs   Lab 03/17/24  2257   INR 1.06        Recent Labs   Lab Test 03/21/24  0734 03/20/24  1852 03/20/24  0614 03/19/24  0625 03/18/24  0635 03/17/24  2257   INR  --   --   --   --   --  1.06   WBC 12.7*  --   --  5.5   < > 11.5*   HGB 7.4* 7.0*   < > 7.9*   < > 8.1*   *  --   --  139*   < > 172    < > = values in this interval not displayed.       Personally reviewed current labs      Maryann Brown, BILL-BC  [facility]  [phone]

## 2024-03-21 NOTE — PROGRESS NOTES
[facility]    Medicine Progress Note - Hospitalist Service    Date of Admission:  3/17/2024    Assessment & Plan   Allen Julian is a 78 year old male with past medical history of HTN, hypothyroidism, CAD, GI bleed due to gastric/duodenal ulcer requiring hospitalization on 10/2023, also required blood transfusion at that time who presented to ED for evaluation of hematemesis, rectal bleeding, reported initially rectal bleeding was red then transition to black stool.  Patient admitted on 3/17/2024 for further management.      Acute GI bleeding, ABLA; suspect due to PUD  History of H. pylori PUD;  On admission, CTA abdomen and pelvis negative for acute GI bleeding.  --On 3/18, EGD reported nonbleeding gastric ulcer with no stigmata of bleeding, also nonbleeding duodenal ulcer with visible vessel treated with bipolar cautery  -- On 3/19, personally discussed with gastroenterologist, recommended twice daily PPI and H. pylori treatment.  Plan for repeat EGD in 2 months  -- On 3/20 had few episode of darker stool and hemoglobin dropped to 5.0.  Patient received total of 3 unit packed RBC transfusion.  --On 3/20 had repeat EGD reported nonbleeding duodenal ulcer with pigmented material, treated with bipolar cautery.  --Hemoglobin 7.2 this morning.  Recheck in a.m. but consider checking sooner if continues to have blackish stool.  Transfuse if hemoglobin 7 or less or hemodynamically unstable.  Discussed with gastroenterologist.    Acute kidney injury on CKD stage III; likely contrast-induced  Metabolic acidosis;  Hyperkalemia;  -- Patient did received IV contrast during this hospitalization  -- Creatinine trending up nephrology consulted, appreciated input.   -- Potassium fluctuating, ordered low potassium diet.    -- On IV fluid per nephrology.  Labs in AM.  -Avoid nephrotoxic medications.    CT imaging reported diffuse thickening of the urinary bladder;  --No hydronephrosis, imaging reported prominent  prostate.    --No urinary complaints and UA unremarkable  --May consider Flomax as likely BPH, defer to PCP    Essential hypertension; fairly controlled  --Blood pressure medications held on admission due to GI bleed, resumed gradually with holding parameters.  Monitor vitals closely    History of hyperlipidemia  Holding aspirin for now as above  Continue with statin     Physical deconditioning/weakness  Appreciate therapy input.     Mild leukocytosis, likely stress, no signs and symptoms of infection.  Thrombocytopenia, likely due to acute blood loss.  Trend        Diet: Combination Diet Regular Diet; 2 gm K Diet    DVT Prophylaxis: Not on pharmacological agent due to GI bleeding requiring transfusion  Jimenez Catheter: Not present  Lines: None     Cardiac Monitoring: None  Code Status: Full Code      Clinically Significant Risk Factors              # Hypoalbuminemia: Lowest albumin = 2.8 g/dL at 3/21/2024  7:34 AM, will monitor as appropriate   # Thrombocytopenia: Lowest platelets = 106 in last 2 days, will monitor for bleeding   # Hypertension: Noted on problem list        # Overweight: Estimated body mass index is 26.37 kg/m  as calculated from the following:    Height as of this encounter: 1.524 m (5').    Weight as of this encounter: 61.2 kg (135 lb)., PRESENT ON ADMISSION            Disposition Plan     Expected Discharge Date: 03/22/2024      Destination: home with family              Ace Dill MD  Hospitalist Service  [facility]  Securely message with [employer] (more info)  Text page via [facility] Paging/Directory   ______________________________________________________________________    Interval History   Patient seen and examined.  Notes, labs, imaging report personally reviewed.  Overnight received 1 more unit packed RBC transfusion.  Hemoglobin in 7 range this morning.  No bowel movement since yesterday evening.  Patient reported feeling better than yesterday.  Denied feeling short of  breath or chest pain.  Complaining of some throat discomfort after EGD, noticed some small scattered erythema.  Denied abdomen pain, nausea, vomiting.  Reported feeling hungry.  Discussed with nursing staff.  Discussed with gastroenterologist.  Patient's son at bedside and declined professional , discussed with him in detail about ongoing medical issues, management and disposition plan.    Physical Exam   Vital Signs: Temp: 98.1  F (36.7  C) Temp src: Oral BP: (!) 146/60 (RN NOTIFIED) Pulse: 60   Resp: 20 SpO2: 94 % O2 Device: None (Room air)    Weight: 135 lbs 0 oz      General: Not in obvious distress.  HEENT: Normocephalic, supple neck  Chest: Clear to auscultation bilateral anteriorly, no wheezing  Heart: S1S2 normal, regular  Abdomen: Soft. NT, ND. Bowel sounds- active.  Extremities: No legs swelling  Neuro: alert and awake, grossly non-focal        Medical Decision Making             Data     I have personally reviewed the following data over the past 24 hrs:    12.7 (H)  \   7.4 (L)   / 106 (L)     141 115 (H) 65.1 (H) /  95   5.3 23 2.45 (H) \     ALT: N/A AST: N/A AP: N/A TBILI: N/A   ALB: 2.8 (L) TOT PROTEIN: N/A LIPASE: N/A       Imaging results reviewed over the past 24 hrs:   No results found for this or any previous visit (from the past 24 hour(s)).

## 2024-03-21 NOTE — PLAN OF CARE
Problem: Adult Inpatient Plan of Care  Goal: Plan of Care Review  Description: The Plan of Care Review/Shift note should be completed every shift.  The Outcome Evaluation is a brief statement about your assessment that the patient is improving, declining, or no change.  This information will be displayed automatically on your shift  note.  3/20/2024 2218 by Leeanne Cooley, RN  Outcome: Progressing  3/20/2024 1406 by Leeanne Cooley RN  Outcome: Progressing   Goal Outcome Evaluation:         Patient returned from EGD, no significant findings. Tolerating clear liquid diet. Two liquid black stools this evening. Complaining of throat pain, cepacol prn. RN noted patient had quite loud expiratory wheezing and coarse lung sounds, no increased work of breathing or oxygen needs. Significant improvement after PRN neb. Productive cough with frequent thin clear sputum, small blood in sputum per family report but RN has not visualized. 1900 hgb was 7.0, hospitalist ordered to transfuse 1 unit PRBC. VSS and on room air.

## 2024-03-21 NOTE — PROGRESS NOTES
GI Progress Note  Allen Julian  -44     Subjective:          Objective:   BP (!) 146/60 (BP Location: Left arm)   Pulse 60   Temp 98.1  F (36.7  C) (Oral)   Resp 20   Ht 1.524 m (5')   Wt 61.2 kg (135 lb)   SpO2 94%   BMI 26.37 kg/m    Body mass index is 26.37 kg/m .   Gen: No acute distress  Cardio: RRR  GI: Non-distended, BS positive, soft, non-tender. No guarding.    Laboratory  Recent Labs   Lab 03/21/24  0734 03/20/24  1852 03/20/24  1300 03/20/24  0614 03/19/24  0625 03/18/24  1551 03/18/24  0635   WBC 12.7*  --   --   --  5.5  --  7.7   RBC 2.51*  --   --   --  2.69*  --  2.66*   HGB 7.4* 7.0* 7.2*   < > 7.9*   < > 7.8*   HCT 22.7*  --   --   --  24.5*  --  24.1*   MCV 90  --   --   --  91  --  91   MCH 29.5  --   --   --  29.4  --  29.3   MCHC 32.6  --   --   --  32.2  --  32.4   RDW 15.8*  --   --   --  15.2*  --  15.2*   *  --   --   --  139*  --  118*    < > = values in this interval not displayed.      Recent Labs   Lab 03/21/24  0734 03/20/24  0614 03/19/24  0625    143 139   CO2 23 20* 21*   BUN 65.1* 76.3* 45.0*     Recent Labs   Lab 03/18/24  0635 03/17/24  2257   ALKPHOS 39* 45   AST 18 25   ALT 12 17     Lab Results   Component Value Date    INR 1.06 03/17/2024     EGD 3/18/2024 (Dr Naylor)   - Normal esophagus.                          - Non-bleeding gastric ulcers with no stigmata of                          bleeding. Biopsied.                          - Non-bleeding duodenal ulcer with a visible vessel.                          Treated with bipolar cautery.     EGD 3/20/24 (Dr Naylor):  Normal esophagus.                          - Non-bleeding gastric ulcers with no stigmata of                          bleeding.                          - Non-bleeding duodenal ulcer with pigmented material.                          Treated with bipolar cautery.                          - No specimens collected.      Assessment:   This is a 77yo male with history of HTN, DM, CAD, angioedema,  CKD, H pylori PUD, previoius GI bleeding.  He presented with GI bleeding.     Upper GI bleed   Duodenal ulcer with visible vessel treated 3/18 and 3/20 by EGD.  Gastric bx 3/18 pos for H pylori.   Hgb 8.1 on admit -- trended down to 5.0 this am -- transfused to 7.2.   -Pt had gastric/duodenal ulcers in Oct 2023, pos for H pylori - unclear if he ever took treatment.   -currently treating H pylori with regimen below..     Patient Active Problem List   Diagnosis    Backache    Anaphylactoid Drug Reaction    Angioedema    Hypothyroidism    Gastrointestinal hemorrhage, unspecified gastrointestinal hemorrhage type    CATALINO (acute kidney injury) (H24)    Anemia due to blood loss, acute    Essential hypertension, benign    BRBPR (bright red blood per rectum)      Plan:   1. Monitor stools and Hgb. Transfuse as appropriate.   2. H pylori tmt: Pepto, Flagyl, doxy, PPI -- 14 day total course.  After completing H pylori treatment, then continue PPI once daily indefinitely.   3. Repeat EGD in 2 months.   4. Low-sodium diet.     Thank you.  Baldev Pérez PA-C  [facility]  [phone]     A total of 25 min was spent on today's care.

## 2024-03-22 LAB
ANION GAP SERPL CALCULATED.3IONS-SCNC: 7 MMOL/L (ref 7–15)
BLD PROD TYP BPU: NORMAL
BLOOD COMPONENT TYPE: NORMAL
BUN SERPL-MCNC: 45.7 MG/DL (ref 8–23)
CALCIUM SERPL-MCNC: 7.9 MG/DL (ref 8.8–10.2)
CHLORIDE SERPL-SCNC: 110 MMOL/L (ref 98–107)
CODING SYSTEM: NORMAL
CREAT SERPL-MCNC: 2.15 MG/DL (ref 0.67–1.17)
CROSSMATCH: NORMAL
DEPRECATED HCO3 PLAS-SCNC: 21 MMOL/L (ref 22–29)
EGFRCR SERPLBLD CKD-EPI 2021: 31 ML/MIN/1.73M2
ERYTHROCYTE [DISTWIDTH] IN BLOOD BY AUTOMATED COUNT: 15.4 % (ref 10–15)
GLUCOSE BLDC GLUCOMTR-MCNC: 127 MG/DL (ref 70–99)
GLUCOSE BLDC GLUCOMTR-MCNC: 232 MG/DL (ref 70–99)
GLUCOSE BLDC GLUCOMTR-MCNC: 96 MG/DL (ref 70–99)
GLUCOSE BLDC GLUCOMTR-MCNC: 97 MG/DL (ref 70–99)
GLUCOSE SERPL-MCNC: 97 MG/DL (ref 70–99)
HCT VFR BLD AUTO: 21.7 % (ref 40–53)
HGB BLD-MCNC: 6.9 G/DL (ref 13.3–17.7)
HGB BLD-MCNC: 7.1 G/DL (ref 13.3–17.7)
ISSUE DATE AND TIME: NORMAL
MCH RBC QN AUTO: 29.7 PG (ref 26.5–33)
MCHC RBC AUTO-ENTMCNC: 32.7 G/DL (ref 31.5–36.5)
MCV RBC AUTO: 91 FL (ref 78–100)
PLATELET # BLD AUTO: 121 10E3/UL (ref 150–450)
POTASSIUM SERPL-SCNC: 5.1 MMOL/L (ref 3.4–5.3)
RBC # BLD AUTO: 2.39 10E6/UL (ref 4.4–5.9)
SODIUM SERPL-SCNC: 138 MMOL/L (ref 135–145)
UNIT ABO/RH: NORMAL
UNIT NUMBER: NORMAL
UNIT STATUS: NORMAL
UNIT TYPE ISBT: 5100
WBC # BLD AUTO: 8.5 10E3/UL (ref 4–11)

## 2024-03-22 PROCEDURE — 99233 SBSQ HOSP IP/OBS HIGH 50: CPT | Performed by: INTERNAL MEDICINE

## 2024-03-22 PROCEDURE — 36415 COLL VENOUS BLD VENIPUNCTURE: CPT | Performed by: INTERNAL MEDICINE

## 2024-03-22 PROCEDURE — P9016 RBC LEUKOCYTES REDUCED: HCPCS | Performed by: INTERNAL MEDICINE

## 2024-03-22 PROCEDURE — 250N000013 HC RX MED GY IP 250 OP 250 PS 637: Performed by: INTERNAL MEDICINE

## 2024-03-22 PROCEDURE — 85027 COMPLETE CBC AUTOMATED: CPT | Performed by: INTERNAL MEDICINE

## 2024-03-22 PROCEDURE — C9113 INJ PANTOPRAZOLE SODIUM, VIA: HCPCS | Performed by: INTERNAL MEDICINE

## 2024-03-22 PROCEDURE — 250N000013 HC RX MED GY IP 250 OP 250 PS 637: Performed by: HOSPITALIST

## 2024-03-22 PROCEDURE — 250N000011 HC RX IP 250 OP 636: Performed by: INTERNAL MEDICINE

## 2024-03-22 PROCEDURE — 250N000013 HC RX MED GY IP 250 OP 250 PS 637: Performed by: PHYSICIAN ASSISTANT

## 2024-03-22 PROCEDURE — 80048 BASIC METABOLIC PNL TOTAL CA: CPT | Performed by: INTERNAL MEDICINE

## 2024-03-22 PROCEDURE — 85018 HEMOGLOBIN: CPT | Performed by: INTERNAL MEDICINE

## 2024-03-22 PROCEDURE — 120N000001 HC R&B MED SURG/OB

## 2024-03-22 PROCEDURE — 250N000013 HC RX MED GY IP 250 OP 250 PS 637: Performed by: STUDENT IN AN ORGANIZED HEALTH CARE EDUCATION/TRAINING PROGRAM

## 2024-03-22 RX ORDER — LANOLIN ALCOHOL/MO/W.PET/CERES
3 CREAM (GRAM) TOPICAL
Status: DISCONTINUED | OUTPATIENT
Start: 2024-03-22 | End: 2024-03-23 | Stop reason: HOSPADM

## 2024-03-22 RX ADMIN — ROSUVASTATIN CALCIUM 40 MG: 40 TABLET, COATED ORAL at 20:49

## 2024-03-22 RX ADMIN — HYDRALAZINE HYDROCHLORIDE 25 MG: 25 TABLET, FILM COATED ORAL at 13:47

## 2024-03-22 RX ADMIN — METRONIDAZOLE 500 MG: 500 TABLET ORAL at 08:39

## 2024-03-22 RX ADMIN — METRONIDAZOLE 500 MG: 500 TABLET ORAL at 20:49

## 2024-03-22 RX ADMIN — BISMUTH SUBSALICYLATE 262 MG: 262 TABLET, CHEWABLE ORAL at 11:29

## 2024-03-22 RX ADMIN — PANTOPRAZOLE SODIUM 40 MG: 40 INJECTION, POWDER, FOR SOLUTION INTRAVENOUS at 08:39

## 2024-03-22 RX ADMIN — BISMUTH SUBSALICYLATE 262 MG: 262 TABLET, CHEWABLE ORAL at 08:40

## 2024-03-22 RX ADMIN — PHENOL 1 ML: 1.5 LIQUID ORAL at 17:18

## 2024-03-22 RX ADMIN — BISMUTH SUBSALICYLATE 262 MG: 262 TABLET, CHEWABLE ORAL at 17:17

## 2024-03-22 RX ADMIN — PHENOL 1 ML: 1.5 LIQUID ORAL at 12:05

## 2024-03-22 RX ADMIN — DOXYCYCLINE HYCLATE 100 MG: 100 CAPSULE ORAL at 20:49

## 2024-03-22 RX ADMIN — HYDRALAZINE HYDROCHLORIDE 25 MG: 25 TABLET, FILM COATED ORAL at 20:49

## 2024-03-22 RX ADMIN — DOXYCYCLINE HYCLATE 100 MG: 100 CAPSULE ORAL at 08:39

## 2024-03-22 RX ADMIN — PHENOL 1 ML: 1.5 LIQUID ORAL at 20:49

## 2024-03-22 RX ADMIN — Medication 3 MG: at 03:13

## 2024-03-22 RX ADMIN — HYDRALAZINE HYDROCHLORIDE 25 MG: 25 TABLET, FILM COATED ORAL at 08:39

## 2024-03-22 RX ADMIN — SODIUM BICARBONATE 650 MG TABLET 650 MG: at 08:39

## 2024-03-22 RX ADMIN — METRONIDAZOLE 500 MG: 500 TABLET ORAL at 13:47

## 2024-03-22 RX ADMIN — PANTOPRAZOLE SODIUM 40 MG: 40 INJECTION, POWDER, FOR SOLUTION INTRAVENOUS at 20:48

## 2024-03-22 RX ADMIN — BISMUTH SUBSALICYLATE 262 MG: 262 TABLET, CHEWABLE ORAL at 20:48

## 2024-03-22 RX ADMIN — ISOSORBIDE MONONITRATE 30 MG: 30 TABLET, EXTENDED RELEASE ORAL at 09:01

## 2024-03-22 RX ADMIN — PHENOL 1 ML: 1.5 LIQUID ORAL at 08:40

## 2024-03-22 RX ADMIN — SODIUM BICARBONATE 650 MG TABLET 650 MG: at 20:48

## 2024-03-22 ASSESSMENT — ACTIVITIES OF DAILY LIVING (ADL)
ADLS_ACUITY_SCORE: 32
ADLS_ACUITY_SCORE: 32
ADLS_ACUITY_SCORE: 31
ADLS_ACUITY_SCORE: 30
ADLS_ACUITY_SCORE: 31
ADLS_ACUITY_SCORE: 32
ADLS_ACUITY_SCORE: 31
ADLS_ACUITY_SCORE: 32
ADLS_ACUITY_SCORE: 31
ADLS_ACUITY_SCORE: 32
ADLS_ACUITY_SCORE: 30
ADLS_ACUITY_SCORE: 30

## 2024-03-22 NOTE — PROGRESS NOTES
[facility]  [facility]   Follow up    Allen Julian   MRN: [id number]  : 1945   DOA: 3/17/2024   CC: ARF/CKD      Assessment and Plan:  78 year old male    ARF/CKD; has underlying CKD with baseline CR 1.5-1.8; now some ARF related to hemodynamics and contrast; improving overall and hope to see return to prior baseline; workup for CKD with paraproteins and UPC ratio pending and plan follow up in our clinic; have scheduled a f/up appt in our clinic on  at 11:30 with ALEXA Hawthorne  Hyperkalemia - chronic kidney disease, metabolic acidosis and suspected GI bleed contributed, currently stable, on bicarb; level acceptable today  Metabolic acidosis -now bicarb  Hematemesis, blood loss anemia - Presented with multiple episodes of hematemesis and rectal bleeding, s/p PRBCs and on protonix. GI following, s/p EGD on 3/17 which showed non-bleeding gastric ulcers and duodenal ulcers, visible vessel was treated with cautery. Treating H. Pylori and on-going PPI. receiving PRBCs per hosp service  Bladder thickening - Noted on CT, urinalysis negative  Hypertension - Mostly controlled,As outpatient on amlodipine, valsartan (on hold with Anton and hyperK), hydralazine, isosorbide  Hyperlipidemia - On statin  Dispo; home today and plan renal follow up; discussed with Dr. Bello    Subjective  No acute events  Objective    Vital signs in last 24 hours  Temp:  [97.6  F (36.4  C)-98.7  F (37.1  C)] 97.6  F (36.4  C)  Pulse:  [55-64] 57  Resp:  [17-18] 18  BP: (127-157)/(61-81) 157/81  SpO2:  [94 %-95 %] 94 %      Intake/Output last 3 shifts  I/O last 3 completed shifts:  In:  [P.O.:1700; I.V.:297]  Out: -   Intake/Output this shift:  I/O this shift:  In: 240 [P.O.:240]  Out: 250 [Urine:250]    Physical Exam  Pt sleeping soundly  Ext: no edema and well perfused  Skin; no rash    Pertinent Labs     Last Renal Panel:  Sodium   Date Value Ref Range Status   2024 138  135 - 145 mmol/L Final     Comment:     Reference intervals for this test were updated on 09/26/2023 to more accurately reflect our healthy population. There may be differences in the flagging of prior results with similar values performed with this method. Interpretation of those prior results can be made in the context of the updated reference intervals.      Potassium   Date Value Ref Range Status   03/22/2024 5.1 3.4 - 5.3 mmol/L Final     Chloride   Date Value Ref Range Status   03/22/2024 110 (H) 98 - 107 mmol/L Final     Carbon Dioxide (CO2)   Date Value Ref Range Status   03/22/2024 21 (L) 22 - 29 mmol/L Final     Anion Gap   Date Value Ref Range Status   03/22/2024 7 7 - 15 mmol/L Final     Glucose   Date Value Ref Range Status   03/22/2024 97 70 - 99 mg/dL Final     GLUCOSE BY METER POCT   Date Value Ref Range Status   03/22/2024 127 (H) 70 - 99 mg/dL Final     Urea Nitrogen   Date Value Ref Range Status   03/22/2024 45.7 (H) 8.0 - 23.0 mg/dL Final     Creatinine   Date Value Ref Range Status   03/22/2024 2.15 (H) 0.67 - 1.17 mg/dL Final     GFR Estimate   Date Value Ref Range Status   03/22/2024 31 (L) >60 mL/min/1.73m2 Final     Calcium   Date Value Ref Range Status   03/22/2024 7.9 (L) 8.8 - 10.2 mg/dL Final     Phosphorus   Date Value Ref Range Status   03/21/2024 3.9 2.5 - 4.5 mg/dL Final     Albumin   Date Value Ref Range Status   03/21/2024 2.8 (L) 3.5 - 5.2 g/dL Final     Recent Labs   Lab 03/22/24  0621 03/21/24  1840 03/21/24  0734 03/20/24  1852 03/20/24  1300   HGB 7.1* 7.9* 7.4* 7.0* 7.2*          I reviewed all lab results  Bertha Tilley MD

## 2024-03-22 NOTE — PROGRESS NOTES
[facility]    Medicine Progress Note - Hospitalist Service    Date of Admission:  3/17/2024    Assessment & Plan   Allen Julian is a 78 year old male with past medical history of HTN, hypothyroidism, CAD, GI bleed due to gastric/duodenal ulcer requiring hospitalization on 10/2023, also required blood transfusion at that time who presented to ED for evaluation of hematemesis, rectal bleeding, reported initially rectal bleeding was red then transition to black stool.  Patient admitted on 3/17/2024 for further management.      Acute GI bleeding, ABLA; suspect due to PUD  History of H. pylori PUD;  On admission, CTA abdomen and pelvis negative for acute GI bleeding.  --On 3/18, EGD reported nonbleeding gastric ulcer with no stigmata of bleeding, also nonbleeding duodenal ulcer with visible vessel treated with bipolar cautery  -- On 3/19, personally discussed with gastroenterologist, recommended twice daily PPI and H. pylori treatment.  Plan for repeat EGD in 2 months  -- On 3/20 had few episode of darker stool and hemoglobin dropped to 5.0.  Patient received total of 3 unit packed RBC transfusion.  --On 3/20 had repeat EGD reported nonbleeding duodenal ulcer with pigmented material, treated with bipolar cautery.  -- On 3/22, hemoglobin 6.9, ordered 1 unit packed RBC transfusion.  Personally discussed with gastroenterologist.  Recheck hemoglobin in a.m.    Acute kidney injury on CKD stage III; likely contrast-induced  Metabolic acidosis;  Hyperkalemia;  -- Patient did received IV contrast during this hospitalization  -- Creatinine trending up nephrology consulted, appreciated input.   -- Potassium fluctuating, ordered low potassium diet.    --Avoid nephrotoxic medications.  -- BMP in a.m.    CT imaging reported diffuse thickening of the urinary bladder;  --No hydronephrosis, imaging reported prominent prostate.    --No urinary complaints and UA unremarkable  --May consider Flomax as likely BPH,  defer to PCP    Essential hypertension; fairly controlled  --Blood pressure medications held on admission due to GI bleed, resumed gradually with holding parameters.  Monitor vitals closely    History of hyperlipidemia  Holding aspirin for now as above  Continue with statin     Physical deconditioning/weakness  Appreciate therapy input.     Mild leukocytosis, likely stress, no signs and symptoms of infection.  Thrombocytopenia, likely due to acute blood loss.  Trend          Diet: Combination Diet Regular Diet; 2 gm K Diet    DVT Prophylaxis: Pneumatic Compression Devices  Jimenez Catheter: Not present  Lines: None     Cardiac Monitoring: None  Code Status: Full Code      Clinically Significant Risk Factors              # Hypoalbuminemia: Lowest albumin = 2.8 g/dL at 3/21/2024  7:34 AM, will monitor as appropriate   # Thrombocytopenia: Lowest platelets = 106 in last 2 days, will monitor for bleeding   # Hypertension: Noted on problem list        # Overweight: Estimated body mass index is 26.37 kg/m  as calculated from the following:    Height as of this encounter: 1.524 m (5').    Weight as of this encounter: 61.2 kg (135 lb).             Disposition Plan      Expected Discharge Date: 03/23/2024      Destination: home with family              Ace Dill MD  Hospitalist Service  [facility]  Securely message with [employer] (more info)  Text page via [facility] Paging/Directory   ______________________________________________________________________    Interval History   Patient seen and examined.  Notes, labs, imaging report personally reviewed.  Overnight on blackish stool, today 1 blackish stool.  However, patient on Pepto-Bismol.  Patient denied abdomen pain, nausea, vomiting.  Denied short of breath or chest pain.  Discussed with nursing staffs.  Discussed with gastroenterologist.  Discussed with nephrologist  Patient's son at bedside and help with interpretation    Physical Exam   Vital Signs: Temp:  99  F (37.2  C) Temp src: Oral BP: 120/57 Pulse: 66   Resp: 18 SpO2: 93 % O2 Device: None (Room air)    Weight: 135 lbs 0 oz      General: Not in obvious distress.  HEENT: Normocephalic, supple neck  Chest: Clear to auscultation bilateral anteriorly, no wheezing  Heart: S1S2 normal, regular  Abdomen: Soft. NT, ND. Bowel sounds- active.  Extremities: No legs swelling  Neuro: alert and awake, grossly non-focal        Medical Decision Making             Data     I have personally reviewed the following data over the past 24 hrs:    8.5  \   6.9 (LL)   / 121 (L)     138 110 (H) 45.7 (H) /  127 (H)   5.1 21 (L) 2.15 (H) \       Imaging results reviewed over the past 24 hrs:   No results found for this or any previous visit (from the past 24 hour(s)).

## 2024-03-22 NOTE — PLAN OF CARE
[id number]    Family at bedside throughout shift. AO x4. Able to make needs known - call light in reach. Rounding on patient. Regular diet (2g K). Pills crushed, per patient and family request - taken with water.     Patient requested sleeping aid - patient unable to fall asleep without medication. Patient appears asleep upon additional rounding following melatonin administration - even chest rise.     RA. VSS per patient baseline - no PRNs needed. R PIV SL. BG monitored per orders. Denies pain. SBA.     Hgb due to be drawn with AM labs.     Problem: Adult Inpatient Plan of Care  Goal: Plan of Care Review  Outcome: Progressing  Goal: Optimal Comfort and Wellbeing  Outcome: Progressing     Problem: Anemia  Goal: Anemia Symptom Improvement  Outcome: Progressing  Intervention: Monitor and Manage Anemia  Recent Flowsheet Documentation  Taken 3/21/2024 2305 by Emmie Ardon, RN  Safety Promotion/Fall Prevention: (Family at bedside;)   assistive device/personal items within reach   clutter free environment maintained   lighting adjusted   nonskid shoes/slippers when out of bed   patient and family education   room organization consistent   safety round/check completed   supervised activity   treat reversible contributory factors   treat underlying cause   other (see comments)  Fatigue Management: activity assistance provided  Oral Nutrition Promotion: rest periods promoted     Problem: Gastrointestinal Bleeding  Goal: Hemostasis  Outcome: Progressing

## 2024-03-22 NOTE — PROGRESS NOTES
GI Progress Note  Allen ROJAS Pha  -24     Subjective:   Son visiting.    No complaints.   Feels OK for discharge today.      Objective:   /57 (BP Location: Left arm)   Pulse 66   Temp 99  F (37.2  C) (Oral)   Resp 18   Ht 1.524 m (5')   Wt 61.2 kg (135 lb)   SpO2 93%   BMI 26.37 kg/m    Body mass index is 26.37 kg/m .   Gen: No acute distress  Cardio: RRR  GI: Non-distended, BS positive, soft, non-tender. No guarding.    Laboratory  Recent Labs   Lab 03/22/24  0621 03/21/24  1840 03/21/24  0734 03/20/24  0614 03/19/24  0625   WBC 8.5  --  12.7*  --  5.5   RBC 2.39*  --  2.51*  --  2.69*   HGB 7.1* 7.9* 7.4*   < > 7.9*   HCT 21.7*  --  22.7*  --  24.5*   MCV 91  --  90  --  91   MCH 29.7  --  29.5  --  29.4   MCHC 32.7  --  32.6  --  32.2   RDW 15.4*  --  15.8*  --  15.2*   *  --  106*  --  139*    < > = values in this interval not displayed.      Recent Labs   Lab 03/22/24  0621 03/21/24  0734 03/20/24  0614    141 143   CO2 21* 23 20*   BUN 45.7* 65.1* 76.3*     Recent Labs   Lab 03/18/24  0635 03/17/24  2257   ALKPHOS 39* 45   AST 18 25   ALT 12 17     Lab Results   Component Value Date    INR 1.06 03/17/2024     EGD 3/18/2024 (Dr Naylor)   - Normal esophagus.                          - Non-bleeding gastric ulcers with no stigmata of                          bleeding. Biopsied.                          - Non-bleeding duodenal ulcer with a visible vessel.                          Treated with bipolar cautery.     EGD 3/20/24 (Dr Naylor):  Normal esophagus.                          - Non-bleeding gastric ulcers with no stigmata of                          bleeding.                          - Non-bleeding duodenal ulcer with pigmented material.                          Treated with bipolar cautery.                          - No specimens collected.      Assessment:   This is a 79yo male with history of HTN, DM, CAD, angioedema, CKD, H pylori PUD, previoius GI bleeding.  He presented with GI  bleeding.     Upper GI bleed   Duodenal ulcer with visible vessel treated 3/18 and 3/20 by EGD.  Gastric bx 3/18 pos for H pylori.   Hgb 8.1 on admit -- trended down to 5.0 yesterday am -- transfused to 7.2 and stable since.   -Pt had gastric/duodenal ulcers in Oct 2023, pos for H pylori - unclear if he ever took treatment.   -currently treating H pylori with regimen below..     Patient Active Problem List   Diagnosis    Backache    Anaphylactoid Drug Reaction    Angioedema    Hypothyroidism    Gastrointestinal hemorrhage, unspecified gastrointestinal hemorrhage type    CATALINO (acute kidney injury) (H24)    Anemia due to blood loss, acute    Essential hypertension, benign    BRBPR (bright red blood per rectum)      Plan:   1. Monitor stools and Hgb.  2. H pylori tmt: Pepto, Flagyl, doxy, PPI -- 14 day total course.  After completing H pylori treatment, then take PPI once daily indefinitely.   3. Repeat EGD in 2 months. [facility] will contact to arrange.   4. Low-sodium diet.     Nothing further from Gi standpoint.  Could discharge later today if Hgb stable and does not show evidence of active bleeding.     Thank you for the opportunity to be involved in this gentleman's care.  Baldev Pérez PA-C  [facility]  [phone]     A total of 25 min was spent on today's care.

## 2024-03-22 NOTE — PLAN OF CARE
Goal Outcome Evaluation:  2231-0501: Pt is A&O x4, vital signs per patient baseline. Room air. Claudia speaking with family present at the bedside to interpret. R PIV SL. 1700 BG 82 at 1717 - apple juice given until meal tray arrived. Bedtime . Hgb 7.9 at 1840, next recheck at AM draw 3/22/24. Denies pain. Provided education regarding strict I&Os, urinals provided for pt use. Pt reports BM x1, requested assessment upon next BM. Regular (2g K) diet per order. Standby assist.        Problem: Adult Inpatient Plan of Care  Goal: Plan of Care Review  Outcome: Progressing     Problem: Gastrointestinal Bleeding  Goal: Hemostasis  Outcome: Progressing

## 2024-03-22 NOTE — PLAN OF CARE
Problem: Adult Inpatient Plan of Care  Goal: Optimal Comfort and Wellbeing  Outcome: Progressing     Problem: Adult Inpatient Plan of Care  Goal: Readiness for Transition of Care  Outcome: Progressing     Problem: Gastrointestinal Bleeding  Goal: Optimal Coping with Acute Illness  Outcome: Progressing   Goal Outcome Evaluation:    - VSS, denies pain  - Able to take meds crushed with water  - up to bathroom SBA w/ family, dark tarry stools  - @ 1433 Hg of 6.9. Ace hirsch, RBCs ordered  Kellen Farias RN

## 2024-03-23 VITALS
RESPIRATION RATE: 16 BRPM | DIASTOLIC BLOOD PRESSURE: 67 MMHG | OXYGEN SATURATION: 95 % | SYSTOLIC BLOOD PRESSURE: 147 MMHG | WEIGHT: 135 LBS | TEMPERATURE: 98.9 F | BODY MASS INDEX: 26.5 KG/M2 | HEART RATE: 61 BPM | HEIGHT: 60 IN

## 2024-03-23 PROBLEM — N17.9 AKI (ACUTE KIDNEY INJURY) (H): Status: ACTIVE | Noted: 2023-10-06

## 2024-03-23 PROBLEM — B96.81: Status: ACTIVE | Noted: 2024-03-23

## 2024-03-23 PROBLEM — I10 ESSENTIAL HYPERTENSION, BENIGN: Status: ACTIVE | Noted: 2023-10-06

## 2024-03-23 PROBLEM — K25.0: Status: ACTIVE | Noted: 2024-03-23

## 2024-03-23 LAB
ANION GAP SERPL CALCULATED.3IONS-SCNC: 9 MMOL/L (ref 7–15)
BUN SERPL-MCNC: 29.2 MG/DL (ref 8–23)
CALCIUM SERPL-MCNC: 8.1 MG/DL (ref 8.8–10.2)
CHLORIDE SERPL-SCNC: 108 MMOL/L (ref 98–107)
CREAT SERPL-MCNC: 1.96 MG/DL (ref 0.67–1.17)
DEPRECATED HCO3 PLAS-SCNC: 21 MMOL/L (ref 22–29)
EGFRCR SERPLBLD CKD-EPI 2021: 34 ML/MIN/1.73M2
GLUCOSE BLDC GLUCOMTR-MCNC: 197 MG/DL (ref 70–99)
GLUCOSE SERPL-MCNC: 99 MG/DL (ref 70–99)
HGB BLD-MCNC: 8.9 G/DL (ref 13.3–17.7)
POTASSIUM SERPL-SCNC: 4.8 MMOL/L (ref 3.4–5.3)
SODIUM SERPL-SCNC: 138 MMOL/L (ref 135–145)

## 2024-03-23 PROCEDURE — 82374 ASSAY BLOOD CARBON DIOXIDE: CPT | Performed by: INTERNAL MEDICINE

## 2024-03-23 PROCEDURE — 250N000011 HC RX IP 250 OP 636: Performed by: INTERNAL MEDICINE

## 2024-03-23 PROCEDURE — 99239 HOSP IP/OBS DSCHRG MGMT >30: CPT | Performed by: INTERNAL MEDICINE

## 2024-03-23 PROCEDURE — 250N000013 HC RX MED GY IP 250 OP 250 PS 637: Performed by: INTERNAL MEDICINE

## 2024-03-23 PROCEDURE — 85018 HEMOGLOBIN: CPT | Performed by: INTERNAL MEDICINE

## 2024-03-23 PROCEDURE — 250N000013 HC RX MED GY IP 250 OP 250 PS 637: Performed by: HOSPITALIST

## 2024-03-23 PROCEDURE — 99231 SBSQ HOSP IP/OBS SF/LOW 25: CPT | Performed by: INTERNAL MEDICINE

## 2024-03-23 PROCEDURE — 250N000013 HC RX MED GY IP 250 OP 250 PS 637: Performed by: PHYSICIAN ASSISTANT

## 2024-03-23 PROCEDURE — 36415 COLL VENOUS BLD VENIPUNCTURE: CPT | Performed by: INTERNAL MEDICINE

## 2024-03-23 PROCEDURE — C9113 INJ PANTOPRAZOLE SODIUM, VIA: HCPCS | Performed by: INTERNAL MEDICINE

## 2024-03-23 RX ORDER — HYDRALAZINE HYDROCHLORIDE 100 MG/1
50 TABLET, FILM COATED ORAL 3 TIMES DAILY
Status: SHIPPED
Start: 2024-03-23

## 2024-03-23 RX ORDER — BISMUTH SUBSALICYLATE 262 MG/1
262 TABLET, CHEWABLE ORAL
Qty: 36 TABLET | Refills: 0 | Status: SHIPPED | OUTPATIENT
Start: 2024-03-23 | End: 2024-04-01

## 2024-03-23 RX ORDER — PANTOPRAZOLE SODIUM 40 MG/1
40 TABLET, DELAYED RELEASE ORAL 2 TIMES DAILY
Qty: 120 TABLET | Refills: 0 | Status: SHIPPED | OUTPATIENT
Start: 2024-03-23

## 2024-03-23 RX ORDER — FERROUS SULFATE 325(65) MG
325 TABLET, DELAYED RELEASE (ENTERIC COATED) ORAL DAILY
Status: SHIPPED
Start: 2024-04-04

## 2024-03-23 RX ORDER — DOXYCYCLINE 100 MG/1
100 CAPSULE ORAL EVERY 12 HOURS
Qty: 18 CAPSULE | Refills: 0 | Status: SHIPPED | OUTPATIENT
Start: 2024-03-23 | End: 2024-04-01

## 2024-03-23 RX ORDER — HYDRALAZINE HYDROCHLORIDE 25 MG/1
25 TABLET, FILM COATED ORAL ONCE
Status: COMPLETED | OUTPATIENT
Start: 2024-03-23 | End: 2024-03-23

## 2024-03-23 RX ORDER — SODIUM BICARBONATE 650 MG/1
650 TABLET ORAL 2 TIMES DAILY
Qty: 60 TABLET | Refills: 0 | Status: SHIPPED | OUTPATIENT
Start: 2024-03-23 | End: 2024-06-13

## 2024-03-23 RX ORDER — HYDRALAZINE HYDROCHLORIDE 25 MG/1
50 TABLET, FILM COATED ORAL 3 TIMES DAILY
Status: DISCONTINUED | OUTPATIENT
Start: 2024-03-23 | End: 2024-03-23 | Stop reason: HOSPADM

## 2024-03-23 RX ORDER — ASPIRIN 81 MG/1
81 TABLET, COATED ORAL DAILY
Status: SHIPPED
Start: 2024-04-01 | End: 2024-06-13

## 2024-03-23 RX ORDER — METRONIDAZOLE 500 MG/1
500 TABLET ORAL 3 TIMES DAILY
Qty: 27 TABLET | Refills: 0 | Status: SHIPPED | OUTPATIENT
Start: 2024-03-23 | End: 2024-04-01

## 2024-03-23 RX ADMIN — ISOSORBIDE MONONITRATE 30 MG: 30 TABLET, EXTENDED RELEASE ORAL at 08:37

## 2024-03-23 RX ADMIN — SODIUM BICARBONATE 650 MG TABLET 650 MG: at 08:37

## 2024-03-23 RX ADMIN — HYDRALAZINE HYDROCHLORIDE 25 MG: 25 TABLET, FILM COATED ORAL at 08:37

## 2024-03-23 RX ADMIN — BISMUTH SUBSALICYLATE 262 MG: 262 TABLET, CHEWABLE ORAL at 10:58

## 2024-03-23 RX ADMIN — DOXYCYCLINE HYCLATE 100 MG: 100 CAPSULE ORAL at 08:37

## 2024-03-23 RX ADMIN — BISMUTH SUBSALICYLATE 262 MG: 262 TABLET, CHEWABLE ORAL at 08:37

## 2024-03-23 RX ADMIN — PANTOPRAZOLE SODIUM 40 MG: 40 INJECTION, POWDER, FOR SOLUTION INTRAVENOUS at 08:36

## 2024-03-23 RX ADMIN — METRONIDAZOLE 500 MG: 500 TABLET ORAL at 08:36

## 2024-03-23 RX ADMIN — Medication 3 MG: at 01:05

## 2024-03-23 RX ADMIN — HYDRALAZINE HYDROCHLORIDE 25 MG: 25 TABLET, FILM COATED ORAL at 10:58

## 2024-03-23 ASSESSMENT — ACTIVITIES OF DAILY LIVING (ADL)
ADLS_ACUITY_SCORE: 32
ADLS_ACUITY_SCORE: 30
ADLS_ACUITY_SCORE: 32
ADLS_ACUITY_SCORE: 30
ADLS_ACUITY_SCORE: 32
ADLS_ACUITY_SCORE: 30
ADLS_ACUITY_SCORE: 33
ADLS_ACUITY_SCORE: 30
ADLS_ACUITY_SCORE: 30
ADLS_ACUITY_SCORE: 33
ADLS_ACUITY_SCORE: 32

## 2024-03-23 NOTE — PLAN OF CARE
Problem: Fatigue  Goal: Improved Activity Tolerance  Outcome: Progressing     Problem: Comorbidity Management  Goal: Blood Pressure in Desired Range  Outcome: Progressing   Goal Outcome Evaluation:       B/p did raise slightly with blood transfusion but sbp 160s upon reassessment.SBA with cares and transfers. Denies pain or discomfort.

## 2024-03-23 NOTE — PLAN OF CARE
Problem: Adult Inpatient Plan of Care  Goal: Optimal Comfort and Wellbeing  Outcome: Adequate for Care Transition      Problem: Fatigue  Goal: Improved Activity Tolerance  Outcome: Adequate for Care Transition  Intervention: Promote Improved Energy  Recent Flowsheet Documentation  Taken 3/23/2024 0800 by Kellen Farias, RN  Fatigue Management: activity assistance provided     Problem: Gastrointestinal Bleeding  Goal: Hemostasis  Outcome: Adequate for Care Transition      Goal Outcome Evaluation:    - discharged with family @ 1150  - Instructions given to family and pt, IV removed  - VSS, denies throat pain  - One time 25 mg hydralazine ordered per Dr. MEYERS. /67    Kellen Farias, RN

## 2024-03-23 NOTE — DISCHARGE SUMMARY
[facility] MEDICINE  DISCHARGE SUMMARY     Primary Care Physician: Katie Cox  Admission Date: 3/17/2024   Discharge Provider: Ace Dill MD Discharge Date: 3/23/2024   Diet:   Active Diet and Nourishment Order   Procedures    Combination Diet Regular Diet; 2 gm K Diet    Diet       Code Status: Full Code   Activity: DCACTIVITY: Activity as tolerated        Condition at Discharge: Good     REASON FOR PRESENTATION(See Admission Note for Details)   Hematemesis, rectal bleeding.  Please refer to H&P for details.    PRINCIPAL & ACTIVE DISCHARGE DIAGNOSES     Principal Problem:    Acute gastric ulcer with hemorrhage and Helicobacter pylori infection  Active Problems:    Gastrointestinal hemorrhage, unspecified gastrointestinal hemorrhage type    CATALINO (acute kidney injury) (H24)    Essential hypertension, benign    BRBPR (bright red blood per rectum)      PENDING LABS     Unresulted Labs Ordered in the Past 30 Days of this Admission       No orders found from 2/16/2024 to 3/18/2024.              PROCEDURES ( this hospitalization only)      Procedure(s):  ESOPHAGOGASTRODUODENOSCOPY    RECOMMENDATIONS TO OUTPATIENT PROVIDER FOR F/U VISIT     Follow-up Appointments     Follow-up and recommended labs and tests       Kidney follow up appointment   4/17 at 11:30 with ALEXA Hawthorne      [facility]  [address] [place] [address] [address]  [place] [address]    Office:  [phone]  Fax:  [phone]        Follow-up and recommended labs and tests       Follow up with primary care provider, Katie Cox, within 5 to 7 days, to   evaluate medication change, for hospital follow- up, and medication   refills. The following labs/tests are recommended: CBC, BMP.  Follow-up with [place] [facility] as recommended.  Please call   them [facility] at [phone] if you do not hear from them in next 3-4   business days.                DISPOSITION     Home    SUMMARY OF HOSPITAL COURSE:       Allen Julian is a 78 year old male with past medical history of HTN, hypothyroidism, CAD, GI bleed due to gastric/duodenal ulcer requiring hospitalization on 10/2023, also required blood transfusion at that time who presented to ED for evaluation of hematemesis, rectal bleeding, reported initially rectal bleeding was red then transition to black stool.  Patient admitted on 3/17/2024 for further management.        Acute GI bleeding, ABLA; suspect due to PUD  History of H. pylori PUD;  On admission, CTA abdomen and pelvis negative for acute GI bleeding.  --On 3/18, EGD reported nonbleeding gastric ulcer with no stigmata of bleeding, also nonbleeding duodenal ulcer with visible vessel treated with bipolar cautery  -- On 3/19, personally discussed with gastroenterologist, recommended twice daily PPI and H. pylori treatment.  Plan for repeat EGD in 2 months  -- On 3/20 had few episode of darker stool and hemoglobin dropped to 5.0.  Patient received total of 3 unit packed RBC transfusion.  --On 3/20 had repeat EGD reported nonbleeding duodenal ulcer with pigmented material, treated with bipolar cautery.  -- On 3/22, hemoglobin 6.9, received 1 unit pRBC transfusion, hemoglobin in 8 range on discharge.  -- Follow-up with GI as recommended.     Acute kidney injury on CKD stage III; likely contrast-induced  Metabolic acidosis;  Hyperkalemia; improved  -- Patient did received IV contrast during this hospitalization  -- Creatinine trending down.  On bicarb tablet for metabolic acidosis.  Hyperkalemia improved.  -- Appreciated nephrology input.  Follow-up with them as recommended.     CT imaging reported diffuse thickening of the urinary bladder;  --No hydronephrosis, imaging reported prominent prostate.    --No urinary complaints and UA unremarkable  --May consider Flomax as likely BPH, defer to PCP     Essential hypertension; fairly controlled  --Blood pressure medications held on admission due to GI bleed, resumed  gradually.  PTA Exforge discontinued due to CATALINO.  Monitor vitals through PCP and adjust medications accordingly.     History of hyperlipidemia  PTA aspirin continue to hold until follow-up with PCP within a week and discussed with PCP when to resume.  Continue with statin     Physical deconditioning/weakness  Appreciate therapy input.  Improving home with assist     Mild leukocytosis, likely stress, no signs and symptoms of infection.  Recheck normal  Thrombocytopenia, likely due to acute blood loss.  Trending up.  Monitor through PCP    Discharge Medications with Med changes:     Current Discharge Medication List        START taking these medications    Details   bismuth subsalicylate (PEPTO BISMOL) 262 MG chewable tablet Take 1 tablet (262 mg) by mouth 4 times daily (before meals and nightly) for 9 days  Qty: 36 tablet, Refills: 0    Associated Diagnoses: Acute gastric ulcer with hemorrhage and Helicobacter pylori infection      doxycycline hyclate (VIBRAMYCIN) 100 MG capsule Take 1 capsule (100 mg) by mouth every 12 hours for 9 days  Qty: 18 capsule, Refills: 0    Associated Diagnoses: Acute gastric ulcer with hemorrhage and Helicobacter pylori infection      metroNIDAZOLE (FLAGYL) 500 MG tablet Take 1 tablet (500 mg) by mouth 3 times daily for 9 days  Qty: 27 tablet, Refills: 0    Associated Diagnoses: Acute gastric ulcer with hemorrhage and Helicobacter pylori infection      sodium bicarbonate 650 MG tablet Take 1 tablet (650 mg) by mouth 2 times daily Please call PCP for refill.  Qty: 60 tablet, Refills: 0    Associated Diagnoses: Metabolic acidosis           CONTINUE these medications which have CHANGED    Details   ASPIRIN LOW DOSE 81 MG EC tablet Take 1 tablet (81 mg) by mouth daily    Associated Diagnoses: Essential hypertension, benign      ferrous sulfate (FE TABS) 325 (65 Fe) MG EC tablet Take 1 tablet (325 mg) by mouth daily    Associated Diagnoses: Anemia due to blood loss, acute      hydrALAZINE  (APRESOLINE) 100 MG tablet Take 0.5 tablets (50 mg) by mouth 3 times daily    Associated Diagnoses: Essential hypertension, benign      pantoprazole (PROTONIX) 40 MG EC tablet Take 1 tablet (40 mg) by mouth 2 times daily Please call PCP for refills.  Qty: 120 tablet, Refills: 0    Associated Diagnoses: Gastrointestinal hemorrhage, unspecified gastrointestinal hemorrhage type           CONTINUE these medications which have NOT CHANGED    Details   acetaminophen (TYLENOL) 500 MG tablet Take 325-650 mg by mouth every 6 hours as needed for mild pain      isosorbide mononitrate (IMDUR) 30 MG 24 hr tablet Take 30 mg by mouth daily      rosuvastatin (CRESTOR) 40 MG tablet Take 40 mg by mouth at bedtime      tiZANidine (ZANAFLEX) 2 MG tablet Take 2-4 mg by mouth every 6 hours as needed for muscle spasms           STOP taking these medications       amLODIPine-valsartan (EXFORGE)  MG tablet Comments:   Reason for Stopping:                     Rationale for medication changes:      Please refer to hospital course        Consults       GASTROENTEROLOGY IP CONSULT  PHYSICAL THERAPY ADULT IP CONSULT  OCCUPATIONAL THERAPY ADULT IP CONSULT  CARE MANAGEMENT / SOCIAL WORK IP CONSULT  NEPHROLOGY IP CONSULT    Immunizations given this encounter     Most Recent Immunizations   Administered Date(s) Administered    COVID-19 12+ (2023-24) (MODERNA) 10/18/2023    COVID-19 Bivalent 12+ (Pfizer) 11/17/2022    COVID-19 MONOVALENT 12+ (Pfizer) 12/28/2021    COVID-19 Monovalent 12+ (Pfizer 2022) 08/27/2022    COVID-19 Monovalent 18+ (Moderna) 04/17/2021    Flu, Unspecified 01/16/2015    Hepatitis A (ADULT 19+) 04/09/2015    Influenza Vaccine 65+ (FLUAD) 10/13/2023    TDAP (Adacel,Boostrix) 11/28/2012           Anticoagulation Information      Recent INR results:   Recent Labs   Lab 03/17/24  2257   INR 1.06         SIGNIFICANT IMAGING FINDINGS     Results for orders placed or performed during the hospital encounter of 03/17/24   CTA  Abdomen Pelvis with Contrast    Impression    IMPRESSION:  1.  No acute GI bleeding.  2.  Diffuse colonic diverticulosis, without definite evidence of diverticulitis.  3.  Mild diffuse hepatic steatosis.  4.  Stable left common and internal iliac artery aneurysms measuring 2.2 cm and 1.8 cm, respectively.  5.  Diffuse thickening of the urinary bladder. Cystitis is possible and correlation with urinalysis is recommended.       SIGNIFICANT LABORATORY FINDINGS     Most Recent 3 CBC's:  Recent Labs   Lab Test 03/23/24  0842 03/22/24  1344 03/22/24  0621 03/21/24  1840 03/21/24  0734 03/20/24  0614 03/19/24  0625   WBC  --   --  8.5  --  12.7*  --  5.5   HGB 8.9* 6.9* 7.1*   < > 7.4*   < > 7.9*   MCV  --   --  91  --  90  --  91   PLT  --   --  121*  --  106*  --  139*    < > = values in this interval not displayed.     Most Recent 3 BMP's:  Recent Labs   Lab Test 03/23/24  0842 03/23/24  0224 03/22/24  2114 03/22/24  0913 03/22/24  0621 03/21/24  1717 03/21/24  0734     --   --   --  138  --  141   POTASSIUM 4.8  --   --   --  5.1  --  5.3   CHLORIDE 108*  --   --   --  110*  --  115*   CO2 21*  --   --   --  21*  --  23   BUN 29.2*  --   --   --  45.7*  --  65.1*   CR 1.96*  --   --   --  2.15*  --  2.45*   ANIONGAP 9  --   --   --  7  --  3*   SAVI 8.1*  --   --   --  7.9*  --  7.9*   GLC 99 197* 97   < > 97   < > 95    < > = values in this interval not displayed.       Discharge Orders        Follow-up and recommended labs and tests     Kidney follow up appointment   4/17 at 11:30 with ALEXA Hawthorne      [facility]  [address]  [place] [address]    Office:  [phone]  Fax:  [phone]     Reason for your hospital stay    Patient admitted for GI bleeding     Follow-up and recommended labs and tests     Follow up with primary care provider, Katie Cox, within 5 to 7 days, to evaluate medication change, for hospital follow- up, and medication refills. The following  labs/tests are recommended: CBC, BMP.  Follow-up with [place] [facility] as recommended.  Please call them [facility] at [phone] if you do not hear from them in next 3-4 business days.     Activity    Your activity upon discharge: activity as tolerated     Discharge Instructions    Hold home baby aspirin until okay from PCP to resume it.  Hold home iron supplement until completes antibiotic course.     Diet    Follow this diet upon discharge: Orders Placed This Encounter      Combination Diet Regular Diet; 2 gm K Diet       Examination   Physical Exam   Temp:  [97.4  F (36.3  C)-99  F (37.2  C)] 98.9  F (37.2  C)  Pulse:  [55-71] 61  Resp:  [16-18] 16  BP: (120-182)/(57-85) 147/67  SpO2:  [92 %-96 %] 95 %  Wt Readings from Last 1 Encounters:   03/17/24 61.2 kg (135 lb)       Patient seen and examined.  Notes, labs, imaging report personally reviewed.  No acute issues reported overnight.  Patient did not have any bowel movement overnight.  Patient denied new concerns or complaints and feels comfortable going home.  Multiple family members at bedside, detailed plan of care after discharge discussed with patient and patient's family members.  Discussed with nursing staff.  Discussed with pharmacist.      General: Not in obvious distress.  HEENT: Normocephalic, supple neck  Chest: Clear to auscultation bilateral anteriorly, no wheezing  Heart: S1S2 normal, regular  Abdomen: Soft. NT, ND. Bowel sounds- active.  Extremities: No legs swelling  Neuro: alert and awake, grossly non-focal          Please see EMR for more detailed significant labs, imaging, consultant notes etc.    Ace MÉNDEZ MD, personally saw the patient today and spent greater than 30 minutes discharging this patient.    Ace Dill MD  [facility]    CC:Katie Cox

## 2024-03-23 NOTE — PROGRESS NOTES
[facility]  [facility]   Follow up    Allen Julian   MRN: [id number]  : 1945   DOA: 3/17/2024   CC: ARF/CKD      Assessment and Plan:  78 year old male    ARF/CKD; has underlying CKD with baseline CR 1.5-1.8; now some ARF related to hemodynamics and contrast; improving serially and now close to prior baseline; workup for CKD with paraproteins and UPC ratio pending and plan follow up in our clinic; have scheduled a f/up appt in our clinic on  at 11:30 with ALEXA Hawthorne  Hyperkalemia - chronic kidney disease, metabolic acidosis and suspected GI bleed contributed, currently stable, on bicarb; level acceptable again today  Metabolic acidosis -now on  bicarb  Hematemesis, blood loss anemia - Presented with multiple episodes of hematemesis and rectal bleeding, s/p PRBCs and on protonix. GI following, s/p EGD on 3/17 which showed non-bleeding gastric ulcers and duodenal ulcers, visible vessel was treated with cautery. Treating H. Pylori and on-going PPI. receiving PRBCs per hosp service; hgb stable; transfused yesterday and wanted to recheck hgb in am which is stable  Bladder thickening - Noted on CT, urinalysis negative  Hypertension - Mostly controlled; as outpatient on amlodipine, valsartan (on hold with Anton and hyperK), hydralazine, isosorbide  Hyperlipidemia - On statin  Dispo; home today and plan renal follow up; discussed with Dr. Bello    Subjective  Family visiting; pt in good spirits; denies pain or nausea; is eating some; no CP or SOB  Objective    Vital signs in last 24 hours  Temp:  [97.4  F (36.3  C)-99  F (37.2  C)] 98.9  F (37.2  C)  Pulse:  [55-71] 61  Resp:  [16-18] 16  BP: (120-182)/(57-85) 147/67  SpO2:  [92 %-96 %] 95 %      Intake/Output last 3 shifts  I/O last 3 completed shifts:  In: 1389 [P.O.:1060]  Out: 550 [Urine:550]  Intake/Output this shift:  I/O this shift:  In: 500 [P.O.:500]  Out: -     Physical Exam  Pt awake and  NAD  CV RRR  Lung clear  Ext: no edema and well perfused  Skin; no rash    Pertinent Labs     Last Renal Panel:  Sodium   Date Value Ref Range Status   03/22/2024 138 135 - 145 mmol/L Final     Comment:     Reference intervals for this test were updated on 09/26/2023 to more accurately reflect our healthy population. There may be differences in the flagging of prior results with similar values performed with this method. Interpretation of those prior results can be made in the context of the updated reference intervals.      Potassium   Date Value Ref Range Status   03/22/2024 5.1 3.4 - 5.3 mmol/L Final     Chloride   Date Value Ref Range Status   03/22/2024 110 (H) 98 - 107 mmol/L Final     Carbon Dioxide (CO2)   Date Value Ref Range Status   03/22/2024 21 (L) 22 - 29 mmol/L Final     Anion Gap   Date Value Ref Range Status   03/22/2024 7 7 - 15 mmol/L Final     GLUCOSE BY METER POCT   Date Value Ref Range Status   03/23/2024 197 (H) 70 - 99 mg/dL Final     Urea Nitrogen   Date Value Ref Range Status   03/22/2024 45.7 (H) 8.0 - 23.0 mg/dL Final     Creatinine   Date Value Ref Range Status   03/22/2024 2.15 (H) 0.67 - 1.17 mg/dL Final     GFR Estimate   Date Value Ref Range Status   03/22/2024 31 (L) >60 mL/min/1.73m2 Final     Calcium   Date Value Ref Range Status   03/22/2024 7.9 (L) 8.8 - 10.2 mg/dL Final     Phosphorus   Date Value Ref Range Status   03/21/2024 3.9 2.5 - 4.5 mg/dL Final     Albumin   Date Value Ref Range Status   03/21/2024 2.8 (L) 3.5 - 5.2 g/dL Final     Recent Labs   Lab 03/22/24  1344 03/22/24  0621 03/21/24  1840 03/21/24  0734 03/20/24  1852   HGB 6.9* 7.1* 7.9* 7.4* 7.0*          I reviewed all lab results  Bertha Tilley MD

## 2024-03-23 NOTE — PLAN OF CARE
2300 - 0730     PRN melatonin given per patient request. Adjusted R PIV taping - IV stabilizers bumping with activity and ambulation - taped from thumb side to top of hand to stabilize and reduce pain. Patient declined offered PRN tylenol for tender PIV site. Grandson at patient's bedside for support and translation. Call light in reach and able to request needs with help of family. Patient appears sleeping with even chest rise upon rounding on patient. RA.    BM x1 and voiding in bedside urinal.     0200 . 3G K diet - family bringing food in for patient.     Problem: Adult Inpatient Plan of Care  Goal: Plan of Care Review  Outcome: Progressing     Problem: Risk for Delirium  Goal: Improved Sleep  Outcome: Progressing     Problem: Gastrointestinal Bleeding  Goal: Hemostasis  Outcome: Progressing

## 2024-06-10 NOTE — H&P (VIEW-ONLY)
[facility]      Preoperative Consultation   Allen Julian   : 1945   Gender: male    Date of Encounter: 6/10/2024    Nursing Notes:   Ridge Robin  6/10/2024  3:11 PM  Sign at exiting of workspace  Chief Complaint   Patient presents with     Preoperative Exam       Additional visit information (chief complaint/health maintenance) shared by patient:     Health Maintenance Due   Topic Date Due     Zoster (shingles) series for age 50+ (1 of 2) Never done     Pneumococcal series for age 65+ (1 of 1 - PCV) Never done     Tetanus booster  2022     COVID-19 vaccine series ( season) 2024       Health maintenance reviewed with patient Yes    Patient presents for an in-person office visit: alone and with son  Communication Method: Patient is active on [employer] and has been instructed that results/communications will be made via [employer]  If a phone call is needed, the preferred number is:  Mobile   Home Phone [phone]   Mobile [phone]     May we leave a detailed message at this number? Yes    ARNOLDO Keen............................... 6/10/2024 3:11 PM         Ridge Robin  6/10/2024  3:15 PM  Sign at exiting of workspace  Allen Julian is a 78 y.o. male (1945) who presents for preop evaluation undergoing procedure for treatment of endoscopy.    Date of Surgery: 2024  Surgical Specialty: To Castillo MD   Hospital/Surgical Facility: [facility]  Fax number: [phone]  Surgery type: outpatient  Primary Physician: Katie Cox RMA............................... 6/10/2024 3:15 PM          History of Present Illness     Allen Julian is a 78 year old M with PMH significant for CAD, HTN, dyslipidemia, hypothyroidism, upper GI bleed due to gastric/duodenal ulcer and history of H. pylori infection whom presents for pre op for EGD.    Patient is doing well. He denies fever, chills, coughing, SOB, abdominal pain, and lower leg swelling. He has some  dark and green stool at times.       Review of Systems   A comprehensive review of systems was negative except for items noted in HPI.    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 Max acetaminophen dose: 4000mg in 24 hrs.     amLODIPine (NORVASC) 10 mg tablet Take 1 Tablet (10 mg) by mouth once daily.     aspirin (ECOTRIN) 81 mg enteric coated tablet Take 1 Tablet (81 mg) by mouth once daily.     cyanocobalamin (Vitamin B-12) 1,000 mcg tablet Take 1 Tablet (1,000 mcg) by mouth once daily.     ferrous sulfate 325 mg delayed release tablet Take 1 Tablet (325 mg) by mouth two times daily with meals.     hydrALAZINE (APRESOLINE) 100 mg tablet Take 1 Tablet (100 mg) by mouth three times daily.     isosorbide mononitrate (IMDUR) 30 mg extended release tablet 24 Hour Take 1 Tablet (30 mg) by mouth once daily.     latanoprost (XALATAN) 0.005 % ophthalmic solution PLACE 1 DROP IN RIGHT EYE AT BEDTIME FOR 2 DAYS/ TXHUA HMO THAUM MUS PW JOSSELYN 1 NCO NANO SAB QHOV MUAG SAB XIS IB ZAUG NANO 2 HNUB *45 DAYS*     levothyroxine (SYNTHROID) 25 mcg tablet Take 1 Tablet (25 mcg) by mouth before breakfast.     pantoprazole (PROTONIX) 40 mg delayed-release tablet Take 1 Tablet (40 mg) by mouth two times daily before meals.     rosuvastatin (CRESTOR) 40 mg tablet Take 1 Tablet (40 mg) by mouth at bedtime.     tiZANidine (ZANAFLEX) 2 mg tablet Take 1-2 Tablets (2-4 mg) by mouth every 6 hours if needed for Muscle Spasm.     No current facility-administered medications for this visit.     Medications have  "been reviewed by me and are current to the best of my knowledge and ability.     Allergies   Allergen Reactions     Other [Unlisted Allergen (Include Detail In Comments)] Throat Swelling/Closing     Grilled meat causes throat to swell     Past Surgical History:   . Laterality Date     APPENDECTOMY       Social History     Tobacco Use     Smoking status: Never     Smokeless tobacco: Never   Vaping Use     Vaping status: Never Used   Substance Use Topics     Alcohol use: No     Drug use: No     No family history on file.      PAST DIFFICULTY WITH ANESTHESIA: None     Physical Exam   Pulse 63   Temp 98.4  F (36.9  C)   Resp 20   Ht 1.48 m (4' 10.27\")   Wt 68 kg (150 lb)   SpO2 95%   BMI 31.06 kg/m   Body mass index is 31.06 kg/m .      GENERAL: No acute distress. Cooperative. Pleasant.   HEAD: Normocephalic atraumatic.  NECK: Full range of motion, no cervical lymphadenopathy  ENT:   No scleral icterus or conjunctival injection noted bilaterally   Right tympanic membrane is clear and right external auditory canal is clear.   Left tympanic membrane is clear and left external auditory canal is clear.   Nares are patent. Rhinorrhea is not present.   Oral mucosa is moist.   Posterior pharynx is clear without any exudate, erythema, or enlarged tonsils  CARDIAC:    Regular rate and rhythm.    No murmurs, gallops or rubs.  PULM:    Clear to ausculation bilaterally.    No rhonchi, wheezing or rales noted.   No increased work in breathing  GI:    +Bowel sounds.    Non-tender in all quadrants.   Non-distended.   Soft, no guarding  MUSK:   No lower leg swelling noted bilaterally.   Moves all limbs independently  SKIN: Warm, dry  PSYCH:    Alert and oriented x 3.    Appropriate affect.        Assessment / Plan     Labs: pending  ECG: NSR with LAD and LVH with HR of 60 bpm similar to EKG from 12/8/2023.      ICD-10-CM    1. Pre-op exam  Z01.818 HEMOGLOBIN     POTASSIUM     CREATININE      2. Upper GI bleed  K92.2       3. " History of Helicobacter pylori infection  Z86.19       4. CAD in native artery  I25.10       5. HTN (hypertension)  I10         Discussed with patient:    Patient Instructions   Discontinue ASA as it can contribute to bleeding.    As long as your blood work is acceptable then you are cleared for surgery.    Continue the ferrous sulfate and Protonix to treat upper GI bleeding.    Get the tetanus shot at your local pharmacy.              Patient is cleared, pending tests ordered today, for planned procedure.   Electronically Signed by:   Torres Ibarra MD    6/10/2024    ++++++++++++++++++++++++++++++++++++++++++++++++++++++++++++++++++++++++++++++++++++++++++++++++++++++++++++++++++++++++++++++++++++++++++++++++++++++++++++++++++++++++++++++  ADDENDUM:  Patient's labs are acceptable. He should drink more liquids to hydrate his kidneys. His red blood cell level has mild improvement.   Latest Reference Range & Units 06/10/24 15:54   POTASSIUM 3.5 - 5.1 mmol/L 4.8   CREATININE 0.70 - 1.20 mg/dL 2.09 (H)   eGFR >90 mL/min/1.73m2 32 (L)   HEMOGLOBIN                13.5 - 17.5 g/dL 10.3 (L)   MCV                       80 - 100 fL 94   (H): Data is abnormally high  (L): Data is abnormally low      He is cleared for his repeat EGD.    Torres Ibarra MD ....................  6/11/2024   5:49 AM

## 2024-06-12 ENCOUNTER — APPOINTMENT (OUTPATIENT)
Dept: INTERPRETER SERVICES | Facility: CLINIC | Age: 79
End: 2024-06-12
Payer: COMMERCIAL

## 2024-06-13 RX ORDER — LANOLIN ALCOHOL/MO/W.PET/CERES
1000 CREAM (GRAM) TOPICAL DAILY
COMMUNITY
Start: 2024-04-05

## 2024-06-13 RX ORDER — AMLODIPINE BESYLATE 10 MG/1
10 TABLET ORAL DAILY
COMMUNITY
Start: 2024-04-05

## 2024-06-21 ENCOUNTER — HOSPITAL ENCOUNTER (OUTPATIENT)
Facility: CLINIC | Age: 79
Discharge: HOME | End: 2024-06-21
Attending: INTERNAL MEDICINE | Admitting: INTERNAL MEDICINE
Payer: COMMERCIAL

## 2024-06-21 ENCOUNTER — ANESTHESIA EVENT (OUTPATIENT)
Dept: SURGERY | Facility: CLINIC | Age: 79
End: 2024-06-21
Payer: COMMERCIAL

## 2024-06-21 ENCOUNTER — ANESTHESIA (OUTPATIENT)
Dept: SURGERY | Facility: CLINIC | Age: 79
End: 2024-06-21
Payer: COMMERCIAL

## 2024-06-21 VITALS
HEART RATE: 64 BPM | OXYGEN SATURATION: 94 % | SYSTOLIC BLOOD PRESSURE: 162 MMHG | DIASTOLIC BLOOD PRESSURE: 65 MMHG | WEIGHT: 135 LBS | TEMPERATURE: 97.1 F | RESPIRATION RATE: 12 BRPM | BODY MASS INDEX: 26.5 KG/M2 | HEIGHT: 60 IN

## 2024-06-21 LAB — UPPER GI ENDOSCOPY: NORMAL

## 2024-06-21 PROCEDURE — 258N000003 HC RX IP 258 OP 636: Mod: JZ | Performed by: STUDENT IN AN ORGANIZED HEALTH CARE EDUCATION/TRAINING PROGRAM

## 2024-06-21 PROCEDURE — 360N000075 HC SURGERY LEVEL 2, PER MIN: Performed by: INTERNAL MEDICINE

## 2024-06-21 PROCEDURE — 999N000141 HC STATISTIC PRE-PROCEDURE NURSING ASSESSMENT: Performed by: INTERNAL MEDICINE

## 2024-06-21 PROCEDURE — 250N000009 HC RX 250: Performed by: NURSE ANESTHETIST, CERTIFIED REGISTERED

## 2024-06-21 PROCEDURE — 272N000001 HC OR GENERAL SUPPLY STERILE: Performed by: INTERNAL MEDICINE

## 2024-06-21 PROCEDURE — 710N000012 HC RECOVERY PHASE 2, PER MINUTE: Performed by: INTERNAL MEDICINE

## 2024-06-21 PROCEDURE — 250N000011 HC RX IP 250 OP 636: Mod: JZ | Performed by: NURSE ANESTHETIST, CERTIFIED REGISTERED

## 2024-06-21 PROCEDURE — 370N000017 HC ANESTHESIA TECHNICAL FEE, PER MIN: Performed by: INTERNAL MEDICINE

## 2024-06-21 RX ORDER — ONDANSETRON 4 MG/1
4 TABLET, ORALLY DISINTEGRATING ORAL EVERY 6 HOURS PRN
Status: CANCELLED | OUTPATIENT
Start: 2024-06-21

## 2024-06-21 RX ORDER — PROCHLORPERAZINE MALEATE 5 MG
5 TABLET ORAL EVERY 6 HOURS PRN
Status: CANCELLED | OUTPATIENT
Start: 2024-06-21

## 2024-06-21 RX ORDER — FENTANYL CITRATE 50 UG/ML
25 INJECTION, SOLUTION INTRAMUSCULAR; INTRAVENOUS
Status: DISCONTINUED | OUTPATIENT
Start: 2024-06-21 | End: 2024-06-21 | Stop reason: HOSPADM

## 2024-06-21 RX ORDER — EPHEDRINE SULFATE 50 MG/ML
INJECTION, SOLUTION INTRAMUSCULAR; INTRAVENOUS; SUBCUTANEOUS PRN
Status: DISCONTINUED | OUTPATIENT
Start: 2024-06-21 | End: 2024-06-21

## 2024-06-21 RX ORDER — NALOXONE HYDROCHLORIDE 0.4 MG/ML
0.4 INJECTION, SOLUTION INTRAMUSCULAR; INTRAVENOUS; SUBCUTANEOUS
Status: DISCONTINUED | OUTPATIENT
Start: 2024-06-21 | End: 2024-06-21 | Stop reason: HOSPADM

## 2024-06-21 RX ORDER — ONDANSETRON 2 MG/ML
4 INJECTION INTRAMUSCULAR; INTRAVENOUS EVERY 6 HOURS PRN
Status: CANCELLED | OUTPATIENT
Start: 2024-06-21

## 2024-06-21 RX ORDER — LIDOCAINE 40 MG/G
CREAM TOPICAL
Status: DISCONTINUED | OUTPATIENT
Start: 2024-06-21 | End: 2024-06-21 | Stop reason: HOSPADM

## 2024-06-21 RX ORDER — ONDANSETRON 2 MG/ML
4 INJECTION INTRAMUSCULAR; INTRAVENOUS EVERY 30 MIN PRN
Status: DISCONTINUED | OUTPATIENT
Start: 2024-06-21 | End: 2024-06-21 | Stop reason: HOSPADM

## 2024-06-21 RX ORDER — DEXAMETHASONE SODIUM PHOSPHATE 10 MG/ML
4 INJECTION, SOLUTION INTRAMUSCULAR; INTRAVENOUS
Status: DISCONTINUED | OUTPATIENT
Start: 2024-06-21 | End: 2024-06-21 | Stop reason: HOSPADM

## 2024-06-21 RX ORDER — FLUMAZENIL 0.1 MG/ML
0.2 INJECTION, SOLUTION INTRAVENOUS
Status: CANCELLED | OUTPATIENT
Start: 2024-06-21 | End: 2024-06-21

## 2024-06-21 RX ORDER — FENTANYL CITRATE 50 UG/ML
50-100 INJECTION, SOLUTION INTRAMUSCULAR; INTRAVENOUS EVERY 5 MIN PRN
Status: DISCONTINUED | OUTPATIENT
Start: 2024-06-21 | End: 2024-06-21 | Stop reason: HOSPADM

## 2024-06-21 RX ORDER — GLYCOPYRROLATE 0.2 MG/ML
INJECTION, SOLUTION INTRAMUSCULAR; INTRAVENOUS PRN
Status: DISCONTINUED | OUTPATIENT
Start: 2024-06-21 | End: 2024-06-21

## 2024-06-21 RX ORDER — EPINEPHRINE 1 MG/ML
0.1 INJECTION, SOLUTION INTRAMUSCULAR; SUBCUTANEOUS
Status: DISCONTINUED | OUTPATIENT
Start: 2024-06-21 | End: 2024-06-21 | Stop reason: HOSPADM

## 2024-06-21 RX ORDER — ATROPINE SULFATE 0.1 MG/ML
1 INJECTION INTRAVENOUS
Status: DISCONTINUED | OUTPATIENT
Start: 2024-06-21 | End: 2024-06-21 | Stop reason: HOSPADM

## 2024-06-21 RX ORDER — PROPOFOL 10 MG/ML
INJECTION, EMULSION INTRAVENOUS CONTINUOUS PRN
Status: DISCONTINUED | OUTPATIENT
Start: 2024-06-21 | End: 2024-06-21

## 2024-06-21 RX ORDER — DIPHENHYDRAMINE HYDROCHLORIDE 50 MG/ML
25-50 INJECTION INTRAMUSCULAR; INTRAVENOUS
Status: DISCONTINUED | OUTPATIENT
Start: 2024-06-21 | End: 2024-06-21 | Stop reason: HOSPADM

## 2024-06-21 RX ORDER — NALOXONE HYDROCHLORIDE 0.4 MG/ML
0.2 INJECTION, SOLUTION INTRAMUSCULAR; INTRAVENOUS; SUBCUTANEOUS
Status: DISCONTINUED | OUTPATIENT
Start: 2024-06-21 | End: 2024-06-21 | Stop reason: HOSPADM

## 2024-06-21 RX ORDER — SIMETHICONE 40MG/0.6ML
133 SUSPENSION, DROPS(FINAL DOSAGE FORM)(ML) ORAL
Status: DISCONTINUED | OUTPATIENT
Start: 2024-06-21 | End: 2024-06-21 | Stop reason: HOSPADM

## 2024-06-21 RX ORDER — FLUMAZENIL 0.1 MG/ML
0.2 INJECTION, SOLUTION INTRAVENOUS
Status: DISCONTINUED | OUTPATIENT
Start: 2024-06-21 | End: 2024-06-21 | Stop reason: HOSPADM

## 2024-06-21 RX ORDER — NALOXONE HYDROCHLORIDE 0.4 MG/ML
0.1 INJECTION, SOLUTION INTRAMUSCULAR; INTRAVENOUS; SUBCUTANEOUS
Status: DISCONTINUED | OUTPATIENT
Start: 2024-06-21 | End: 2024-06-21 | Stop reason: HOSPADM

## 2024-06-21 RX ORDER — ONDANSETRON 4 MG/1
4 TABLET, ORALLY DISINTEGRATING ORAL EVERY 30 MIN PRN
Status: DISCONTINUED | OUTPATIENT
Start: 2024-06-21 | End: 2024-06-21 | Stop reason: HOSPADM

## 2024-06-21 RX ORDER — SODIUM CHLORIDE, SODIUM LACTATE, POTASSIUM CHLORIDE, CALCIUM CHLORIDE 600; 310; 30; 20 MG/100ML; MG/100ML; MG/100ML; MG/100ML
INJECTION, SOLUTION INTRAVENOUS CONTINUOUS
Status: DISCONTINUED | OUTPATIENT
Start: 2024-06-21 | End: 2024-06-21 | Stop reason: HOSPADM

## 2024-06-21 RX ADMIN — PROPOFOL 200 MCG/KG/MIN: 10 INJECTION, EMULSION INTRAVENOUS at 10:00

## 2024-06-21 RX ADMIN — Medication 10 MG: at 10:04

## 2024-06-21 RX ADMIN — SODIUM CHLORIDE, POTASSIUM CHLORIDE, SODIUM LACTATE AND CALCIUM CHLORIDE: 600; 310; 30; 20 INJECTION, SOLUTION INTRAVENOUS at 09:10

## 2024-06-21 RX ADMIN — GLYCOPYRROLATE 0.2 MG: 0.2 INJECTION INTRAMUSCULAR; INTRAVENOUS at 10:02

## 2024-06-21 ASSESSMENT — ACTIVITIES OF DAILY LIVING (ADL)
ADLS_ACUITY_SCORE: 36
ADLS_ACUITY_SCORE: 36

## 2024-06-21 NOTE — ANESTHESIA PREPROCEDURE EVALUATION
Anesthesia Pre-Procedure Evaluation    Patient: Allen Julian   MRN: [id number] : 1945        Procedure : Procedure(s):  ESOPHAGOGASTRODUODENOSCOPY          Past Medical History:   Diagnosis Date    Arthritis       Past Surgical History:   Procedure Laterality Date    ESOPHAGOSCOPY, GASTROSCOPY, DUODENOSCOPY (EGD), COMBINED N/A 10/07/2023    Procedure: ESOPHAGOGASTRODUODENOSCOPY;  Surgeon: Osiel Stinson MD;  [place] [facility] [place]    ESOPHAGOSCOPY, GASTROSCOPY, DUODENOSCOPY (EGD), COMBINED N/A 2024    Procedure: ESOPHAGOGASTRODUODENOSCOPY WITH BiCAP AND BIOPSY;  Surgeon: Nic Naylor MD;  [place] [facility] [place]    ESOPHAGOSCOPY, GASTROSCOPY, DUODENOSCOPY (EGD), COMBINED N/A 2024    Procedure: ESOPHAGOGASTRODUODENOSCOPY;  Surgeon: Nic Naylor MD;  [place] [facility] [place]      No Known Allergies   Social History     Tobacco Use    Smoking status: Never    Smokeless tobacco: Never   Substance Use Topics    Alcohol use: Never      Wt Readings from Last 1 Encounters:   24 61.2 kg (135 lb)        Anesthesia Evaluation   Pt has had prior anesthetic. Type: MAC and General.    No history of anesthetic complications       ROS/MED HX  ENT/Pulmonary:  - neg pulmonary ROS     Neurologic:  - neg neurologic ROS  (-) no CVA and no TIA   Cardiovascular: Comment: 1. Severe aortic insufficiency due to annular dilation and poor central coaptation.    Trileaflet aortic valve. Posteriorly-directed jet from  center of the valve.  Vena contracta 4mm (difficult to measure, possibly underestimated).    EROA 0.42 cm^2.  Holodiastolic reversal of  flow in the descending thoracic aorta.  2. Aortic sinus of Valsalva is severely dilated (5.4 cm, ZScore = 6.8). Mild ascending aorta  dilatation (4.4 cm).  3. Estimated left ventricular ejection fraction is 55-60%.  4. Normal right ventricular chamber size. Normal right ventricular systolic function.  5. No thrombus in the left atrial  appendage.  6. Intact atrial septum. No evidence of inter-atrial shunt by color flow Doppler and agitated  saline injection.    - neg cardiovascular ROS   (+)  hypertension- -   -  - -                           valvular problems/murmurs type: AI          METS/Exercise Tolerance: >4 METS    Hematologic:  - neg hematologic  ROS   (+)      anemia, history of blood transfusion,         Musculoskeletal:  - neg musculoskeletal ROS (+)  arthritis,             GI/Hepatic: Comment: GI bleed, still requiring transfusions - neg GI/hepatic ROS     Renal/Genitourinary:  - neg Renal ROS   (+) renal disease, type: CRI, Pt does not require dialysis,           Endo:  - neg endo ROS   (+)          thyroid problem,            Psychiatric/Substance Use:  - neg psychiatric ROS     Infectious Disease:  - neg infectious disease ROS     Malignancy:  - neg malignancy ROS     Other:  - neg other ROS          Physical Exam    Airway        Mallampati: III   TM distance: > 3 FB   Neck ROM: full     Respiratory Devices and Support         Dental       (+) Edentulous    B=Bridge, C=Chipped, L=Loose, M=Missing    Cardiovascular          Rhythm and rate: regular     Pulmonary           breath sounds clear to auscultation           OUTSIDE LABS:  CBC:   Lab Results   Component Value Date    WBC 8.5 03/22/2024    WBC 12.7 (H) 03/21/2024    HGB 8.9 (L) 03/23/2024    HGB 6.9 (LL) 03/22/2024    HCT 21.7 (L) 03/22/2024    HCT 22.7 (L) 03/21/2024     (L) 03/22/2024     (L) 03/21/2024     BMP:   Lab Results   Component Value Date     03/23/2024     03/22/2024    POTASSIUM 4.8 03/23/2024    POTASSIUM 5.1 03/22/2024    CHLORIDE 108 (H) 03/23/2024    CHLORIDE 110 (H) 03/22/2024    CO2 21 (L) 03/23/2024    CO2 21 (L) 03/22/2024    BUN 29.2 (H) 03/23/2024    BUN 45.7 (H) 03/22/2024    CR 1.96 (H) 03/23/2024    CR 2.15 (H) 03/22/2024    GLC 99 03/23/2024     (H) 03/23/2024     COAGS:   Lab Results   Component Value Date    PTT  "23 03/17/2024    INR 1.06 03/17/2024     POC: No results found for: \"BGM\", \"HCG\", \"HCGS\"  HEPATIC:   Lab Results   Component Value Date    ALBUMIN 2.8 (L) 03/21/2024    PROTTOTAL 4.9 (L) 03/18/2024    ALT 12 03/18/2024    AST 18 03/18/2024    ALKPHOS 39 (L) 03/18/2024    BILITOTAL 0.8 03/18/2024     OTHER:   Lab Results   Component Value Date    LACT 1.8 03/18/2024    A1C 5.6 03/17/2024    SAVI 8.1 (L) 03/23/2024    PHOS 3.9 03/21/2024    MAG 2.3 03/19/2024    LIPASE 22 10/06/2023    TSH 17.51 (H) 10/06/2023    T4 0.74 (L) 10/06/2023       Anesthesia Plan    ASA Status:  3    NPO Status:  NPO Appropriate    Anesthesia Type: MAC.     - Reason for MAC: straight local not clinically adequate, immobility needed   Induction: Intravenous.   Maintenance: TIVA.        Consents    Anesthesia Plan(s) and associated risks, benefits, and realistic alternatives discussed. Questions answered and patient/representative(s) expressed understanding.     - Discussed: Risks, Benefits and Alternatives for BOTH SEDATION and the PROCEDURE were discussed     - Discussed with:  Patient            Postoperative Care       PONV prophylaxis: Background Propofol Infusion, Ondansetron (or other 5HT-3)     Comments:    Other Comments: Light sedation w/ prop. Ephedrine boluses available. Maintain an upper normal HR.            Jimmy Conway MD    I have reviewed the pertinent notes and labs in the chart from the past 30 days and (re)examined the patient.  Any updates or changes from those notes are reflected in this note.                 "

## 2024-06-21 NOTE — PROGRESS NOTES
PRE-PROCEDURE NOTE      REASON FOR PROCEDURE: Follow up gastric ulcers    History and Physical: Reviewed, no changes    Pre-sedation Assessment:     VS: AVSS  General: Alert, NAD  Airway: normal  Heart: Murmur consistent with known AI  Lungs: CTA    Previous Reaction to Sedation: None    Sedation Plan Based on Assessment: MAC    Mallampati: II    ASA Classification: 3      Impression: Patient deemed adequate candidate for MAC sedation    Plan: esophagogastroduodenoscopy              To Castillo MD  Thank you for the opportunity to participate in the care of this patient.   Please feel free to call me with any questions or concerns.  Phone number [phone].            6/21/2024 9:49 AM

## 2024-06-21 NOTE — INTERVAL H&P NOTE
I have reviewed the surgical (or preoperative) H&P that is linked to this encounter, and examined the patient. There are no significant changes    Clinical Conditions Present on Arrival:  Clinically Significant Risk Factors Present on Admission                      # Overweight: Estimated body mass index is 26.37 kg/m  as calculated from the following:    Height as of this encounter: 1.524 m (5').    Weight as of this encounter: 61.2 kg (135 lb).

## 2024-06-21 NOTE — ANESTHESIA CARE TRANSFER NOTE
Patient: Allen N Pha    Procedure: Procedure(s):  ESOPHAGOGASTRODUODENOSCOPY       Diagnosis: Acute gastric ulcer with hemorrhage [K25.0]  Diagnosis Additional Information: No value filed.    Anesthesia Type:   MAC     Note:    Oropharynx: oropharynx clear of all foreign objects and spontaneously breathing  Level of Consciousness: awake  Oxygen Supplementation: face mask    Independent Airway: airway patency satisfactory and stable  Dentition: dentition unchanged  Vital Signs Stable: post-procedure vital signs reviewed and stable  Report to RN Given: handoff report given  Patient transferred to: Phase II    Handoff Report: Identifed the Patient, Identified the Reponsible Provider, Reviewed the pertinent medical history, Discussed the surgical course, Reviewed Intra-OP anesthesia mangement and issues during anesthesia, Set expectations for post-procedure period and Allowed opportunity for questions and acknowledgement of understanding      Vitals:  Vitals Value Taken Time   /62 06/21/24 1016   Temp 36.2  C (97.1  F) 06/21/24 1012   Pulse 74 06/21/24 1016   Resp 12 06/21/24 1012   SpO2 96 % 06/21/24 1016   Vitals shown include unfiled device data.    Electronically Signed By: DONALD RIVERS CRNA  June 21, 2024  10:17 AM

## 2024-12-08 ENCOUNTER — HEALTH MAINTENANCE LETTER (OUTPATIENT)
Age: 79
End: 2024-12-08

## (undated) DEVICE — SUCTION MANIFOLD NEPTUNE 2 SYS 1 PORT 702-025-000

## (undated) DEVICE — Device

## (undated) DEVICE — TUBING SUCTION MEDI-VAC 1/4"X20' N620A

## (undated) DEVICE — FORCEP BIOPSY 2.3MM DISP COATED 000388

## (undated) DEVICE — SOL WATER IRRIG 1000ML BOTTLE 2F7114

## (undated) DEVICE — NDL SCLEROTHERAPY 25GA CARR-LOCK  00711811

## (undated) DEVICE — PROBE BICAP 7FR BP-7300A

## (undated) DEVICE — TUBING SUCTION MEDI-VAC 1/4"X20' N620A - HE

## (undated) RX ORDER — GLYCOPYRROLATE 0.2 MG/ML
INJECTION, SOLUTION INTRAMUSCULAR; INTRAVENOUS
Status: DISPENSED
Start: 2024-06-21

## (undated) RX ORDER — EPHEDRINE SULFATE 50 MG/ML
INJECTION, SOLUTION INTRAMUSCULAR; INTRAVENOUS; SUBCUTANEOUS
Status: DISPENSED
Start: 2024-06-21

## (undated) RX ORDER — ONDANSETRON 2 MG/ML
INJECTION INTRAMUSCULAR; INTRAVENOUS
Status: DISPENSED
Start: 2024-03-20

## (undated) RX ORDER — FENTANYL CITRATE 50 UG/ML
INJECTION, SOLUTION INTRAMUSCULAR; INTRAVENOUS
Status: DISPENSED
Start: 2024-03-18